# Patient Record
Sex: FEMALE | Race: WHITE | NOT HISPANIC OR LATINO | Employment: FULL TIME | URBAN - METROPOLITAN AREA
[De-identification: names, ages, dates, MRNs, and addresses within clinical notes are randomized per-mention and may not be internally consistent; named-entity substitution may affect disease eponyms.]

---

## 2017-02-21 ENCOUNTER — TRANSFERRED RECORDS (OUTPATIENT)
Dept: HEALTH INFORMATION MANAGEMENT | Facility: CLINIC | Age: 32
End: 2017-02-21

## 2017-02-21 LAB
CREAT SERPL-MCNC: 0.67 MG/DL (ref 0.52–1.04)
GFR SERPL CREATININE-BSD FRML MDRD: >59 ML/MIN/1.73M2
GLUCOSE SERPL-MCNC: 95 MG/DL (ref 74–100)
HPV ABSTRACT: NORMAL
PAP-ABSTRACT: NORMAL
POTASSIUM SERPL-SCNC: 4.1 MMOL/L (ref 3.5–5)
TSH SERPL-ACNC: 0.4 UIU/ML (ref 0.4–4.7)

## 2017-02-28 ENCOUNTER — TRANSFERRED RECORDS (OUTPATIENT)
Dept: HEALTH INFORMATION MANAGEMENT | Facility: CLINIC | Age: 32
End: 2017-02-28

## 2017-02-28 LAB
CHOLEST SERPL-MCNC: 97 MG/DL
GLUCOSE SERPL-MCNC: 86 MG/DL (ref 74–100)
HDLC SERPL-MCNC: 46 MG/DL
LDLC SERPL CALC-MCNC: 42 MG/DL
NONHDLC SERPL-MCNC: 51 MG/DL
TRIGL SERPL-MCNC: 43 MG/DL

## 2017-03-02 ENCOUNTER — TRANSFERRED RECORDS (OUTPATIENT)
Dept: HEALTH INFORMATION MANAGEMENT | Facility: CLINIC | Age: 32
End: 2017-03-02

## 2017-03-15 ENCOUNTER — TRANSFERRED RECORDS (OUTPATIENT)
Dept: MULTI SPECIALTY CLINIC | Facility: CLINIC | Age: 32
End: 2017-03-15

## 2017-03-15 LAB — PAP SMEAR - HIM PATIENT REPORTED: NEGATIVE

## 2017-06-27 ENCOUNTER — TRANSFERRED RECORDS (OUTPATIENT)
Dept: HEALTH INFORMATION MANAGEMENT | Facility: CLINIC | Age: 32
End: 2017-06-27

## 2019-11-11 ENCOUNTER — OFFICE VISIT (OUTPATIENT)
Dept: FAMILY MEDICINE | Facility: CLINIC | Age: 34
End: 2019-11-11
Payer: COMMERCIAL

## 2019-11-11 VITALS
SYSTOLIC BLOOD PRESSURE: 94 MMHG | WEIGHT: 135.8 LBS | TEMPERATURE: 98.7 F | HEART RATE: 65 BPM | DIASTOLIC BLOOD PRESSURE: 60 MMHG | HEIGHT: 62 IN | BODY MASS INDEX: 24.99 KG/M2 | OXYGEN SATURATION: 100 %

## 2019-11-11 DIAGNOSIS — K21.9 GASTROESOPHAGEAL REFLUX DISEASE, ESOPHAGITIS PRESENCE NOT SPECIFIED: ICD-10-CM

## 2019-11-11 DIAGNOSIS — G43.009 MIGRAINE WITHOUT AURA AND WITHOUT STATUS MIGRAINOSUS, NOT INTRACTABLE: ICD-10-CM

## 2019-11-11 DIAGNOSIS — Z00.00 ROUTINE GENERAL MEDICAL EXAMINATION AT A HEALTH CARE FACILITY: Primary | ICD-10-CM

## 2019-11-11 DIAGNOSIS — N94.6 DYSMENORRHEA: ICD-10-CM

## 2019-11-11 DIAGNOSIS — Z23 NEED FOR VACCINATION: ICD-10-CM

## 2019-11-11 PROCEDURE — 90471 IMMUNIZATION ADMIN: CPT | Performed by: NURSE PRACTITIONER

## 2019-11-11 PROCEDURE — 99214 OFFICE O/P EST MOD 30 MIN: CPT | Mod: 25 | Performed by: NURSE PRACTITIONER

## 2019-11-11 PROCEDURE — 90686 IIV4 VACC NO PRSV 0.5 ML IM: CPT | Performed by: NURSE PRACTITIONER

## 2019-11-11 PROCEDURE — 99385 PREV VISIT NEW AGE 18-39: CPT | Mod: 25 | Performed by: NURSE PRACTITIONER

## 2019-11-11 RX ORDER — ESOMEPRAZOLE MAGNESIUM 40 MG/1
40 CAPSULE, DELAYED RELEASE ORAL
Qty: 90 CAPSULE | Refills: 3 | Status: SHIPPED | OUTPATIENT
Start: 2019-11-11 | End: 2022-12-16

## 2019-11-11 RX ORDER — SUMATRIPTAN 50 MG/1
50 TABLET, FILM COATED ORAL
Qty: 10 TABLET | Refills: 2 | Status: SHIPPED | OUTPATIENT
Start: 2019-11-11 | End: 2019-12-06 | Stop reason: SINTOL

## 2019-11-11 RX ORDER — PREDNISOLONE ACETATE 10 MG/ML
1-2 SUSPENSION/ DROPS OPHTHALMIC 4 TIMES DAILY
COMMUNITY
End: 2020-09-03

## 2019-11-11 RX ORDER — LEVONORGESTREL/ETHIN.ESTRADIOL 0.1-0.02MG
1 TABLET ORAL DAILY
Qty: 112 TABLET | Refills: 3 | Status: SHIPPED | OUTPATIENT
Start: 2019-11-11 | End: 2020-02-04

## 2019-11-11 RX ORDER — ESOMEPRAZOLE MAGNESIUM 40 MG/1
40 CAPSULE, DELAYED RELEASE ORAL
COMMUNITY
End: 2019-11-11

## 2019-11-11 RX ORDER — LEVONORGESTREL/ETHIN.ESTRADIOL 0.1-0.02MG
1 TABLET ORAL DAILY
Qty: 90 TABLET | Refills: 3 | Status: SHIPPED | OUTPATIENT
Start: 2019-11-11 | End: 2019-11-11

## 2019-11-11 ASSESSMENT — MIFFLIN-ST. JEOR: SCORE: 1265.26

## 2019-11-11 NOTE — PROGRESS NOTES
SUBJECTIVE:   CC: Consuelo Cyr is an 34 year old woman who presents for preventive health visit.     Healthy Habits:    Do you get at least three servings of calcium containing foods daily (dairy, green leafy vegetables, etc.)? yes    Amount of exercise or daily activities, outside of work: 3 day(s) per week    Problems taking medications regularly No    Medication side effects: No    Have you had an eye exam in the past two years? yes    Do you see a dentist twice per year? yes    Do you have sleep apnea, excessive snoring or daytime drowsiness?no      Pap smear done on this date: 3/15/17 (approximately), by this group: [facility] in [place], [place], results were normal.       Social:   for 13 years. 3 children, ages 11, 8 and 5   Works at [employer].    Moved from [place], [place] due to husbands job change.        GERD:   Nexium, 40 mg daily.  Needs refill.  Feels like this isn't helping as well.    Previously on Omeprazole, 20 mg.    EGD previously done in March 2015, showed some irritation of the stomach lining.   Completed Whole 30 - sensitive to corn, wheat and dairy.    Decreased coffee intake.  No soda typically.  Avoids acidic foods.        Migraines:    Reports that aren't debiliating, but will need silence, dark room and heat on head.   Was previously working from home and now works in environment with Fluorescence lights.    Has been taking Excedrin (10 doses per month) and then finds to have rebound headaches.    Weekly migraines currently.  No visual concerns or aura.    Will feel a sensation in her neck and then will know she is getting a migraine.    Isn't able to take Ibuprofen due to GERD.       LMP:  Change in menstrual cycle.    26-28 day cycles to now 23-26 day cycles.   Cramping which feels like a contraction (1st day is worse).   Spotting 4 days before cycle starts.    Waking up with diaphoresis in the middle of the night.    +Irritability and mood changes prior to onset of menses.  Feels  like she is spending majority of her month with hormone changes.      Tried Mirena and didn't tolerate well.       had a vasectomy.        Today's PHQ-2 Score:   PHQ-2 (  [employer]) 2019   Q1: Little interest or pleasure in doing things 0   Q2: Feeling down, depressed or hopeless 0   PHQ-2 Score 0     Abuse: Current or Past(Physical, Sexual or Emotional)- No  Do you feel safe in your environment? Yes    Social History     Tobacco Use     Smoking status: Former Smoker     Packs/day: 0.00     Last attempt to quit: 2007     Years since quittin.0     Smokeless tobacco: Never Used   Substance Use Topics     Alcohol use: Yes     Comment: 1 TIME PER MONTH      If you drink alcohol do you typically have >3 drinks per day or >7 drinks per week? Not Applicable                     Reviewed orders with patient.  Reviewed health maintenance and updated orders accordingly - Yes  BP Readings from Last 3 Encounters:   19 94/60    Wt Readings from Last 3 Encounters:   19 61.6 kg (135 lb 12.8 oz)                  Patient Active Problem List   Diagnosis     Migraine without aura and without status migrainosus, not intractable     Dysmenorrhea     Gastroesophageal reflux disease, esophagitis presence not specified     Past Surgical History:   Procedure Laterality Date     CHOLECYSTECTOMY         Social History     Tobacco Use     Smoking status: Former Smoker     Packs/day: 0.00     Last attempt to quit: 2007     Years since quittin.0     Smokeless tobacco: Never Used   Substance Use Topics     Alcohol use: Yes     Comment: 1 TIME PER MONTH      Family History   Problem Relation Age of Onset     Hypertension Mother      Osteopenia Mother      Arthritis Mother          Current Outpatient Medications   Medication Sig Dispense Refill     calcium carbonate-vitamin D (OS-AMERICA) 500-400 MG-UNIT tablet Take 1 tablet by mouth daily       esomeprazole (NEXIUM) 40 MG DR capsule Take 1 capsule (40  mg) by mouth every morning (before breakfast) Take 30-60 minutes before eating. 90 capsule 3     levonorgestrel-ethinyl estradiol (AVIANE/ALESSE/LESSINA) 0.1-20 MG-MCG tablet Take 1 tablet by mouth daily 112 tablet 3     prednisoLONE acetate (PRED FORTE) 1 % ophthalmic suspension 1-2 drops 4 times daily       SUMAtriptan (IMITREX) 50 MG tablet Take 1 tablet (50 mg) by mouth at onset of headache for migraine May repeat in 2 hours. Max 4 tablets/24 hours. 10 tablet 2       Mammogram not appropriate for this patient based on age.    Pertinent mammograms are reviewed under the imaging tab.  History of abnormal Pap smear: NO - age 30-65 PAP every 5 years with negative HPV co-testing recommended  DEAN pending.       Reviewed and updated as needed this visit by clinical staff  Tobacco  Allergies  Meds  Problems  Med Hx  Surg Hx  Fam Hx  Soc Hx          Reviewed and updated as needed this visit by Provider  Tobacco  Allergies  Meds  Problems  Med Hx  Surg Hx  Fam Hx        Past Medical History:   Diagnosis Date     GERD (gastroesophageal reflux disease)       Past Surgical History:   Procedure Laterality Date     CHOLECYSTECTOMY  2015       ROS:  CONSTITUTIONAL: NEGATIVE for fever, chills, change in weight  INTEGUMENTARU/SKIN: NEGATIVE for worrisome rashes, moles or lesions  EYES: NEGATIVE for vision changes or irritation  ENT: NEGATIVE for ear, mouth and throat problems  RESP: NEGATIVE for significant cough or SOB  BREAST: NEGATIVE for masses, tenderness or discharge  CV: NEGATIVE for chest pain, palpitations or peripheral edema  GI: NEGATIVE for nausea, abdominal pain,  or change in bowel habits, +GERD  : NEGATIVE for unusual urinary or vaginal symptoms. See HPI.  MUSCULOSKELETAL: NEGATIVE for significant arthralgias or myalgia, POSITIVE for left knee pain - mother with history of DJD  NEURO: NEGATIVE for weakness, dizziness or paresthesias  PSYCHIATRIC: NEGATIVE for changes in mood or affect    OBJECTIVE:  "  BP 94/60 (BP Location: Right arm, Patient Position: Chair, Cuff Size: Adult Regular)   Pulse 65   Temp 98.7  F (37.1  C) (Oral)   Ht 1.568 m (5' 1.75\")   Wt 61.6 kg (135 lb 12.8 oz)   SpO2 100%   BMI 25.04 kg/m    EXAM:  GENERAL: healthy, alert and no distress  EYES: Eyes grossly normal to inspection, PERRL and conjunctivae and sclerae normal  HENT: ear canals and TM's normal, nose and mouth without ulcers or lesions  NECK: no adenopathy, no asymmetry, masses, or scars and thyroid normal to palpation  RESP: lungs clear to auscultation - no rales, rhonchi or wheezes  CV: regular rate and rhythm, normal S1 S2, no S3 or S4, no murmur, click or rub, no peripheral edema  ABDOMEN: soft, nontender, no hepatosplenomegaly, no masses and bowel sounds normal  MS: no gross musculoskeletal defects noted, no edema  SKIN: no suspicious lesions or rashes  NEURO: Normal strength and tone, mentation intact and speech normal  PSYCH: mentation appears normal, affect normal/bright      ASSESSMENT/PLAN:     Consuelo was seen today for physical.    Diagnoses and all orders for this visit:    Routine general medical examination at a health care facility  Need for vaccination  Pap smear completed 2 years ago, normal - DEAN pending.   No current screening labs required, patient reports recent normal labs.    -     INFLUENZA VACCINE IM > 6 MONTHS VALENT IIV4 [59477]  -          ADMIN VACCINE, FIRST [00802]    Gastroesophageal reflux disease, esophagitis presence not specified  Currently not adequately controlled on Nexium, 40 mg daily.   History of EGD in 2015.  Will plan further consultation with GI regarding management of GERD.    -     GASTROENTEROLOGY ADULT REF CONSULT ONLY  -     esomeprazole (NEXIUM) 40 MG DR capsule; Take 1 capsule (40 mg) by mouth every morning (before breakfast) Take 30-60 minutes before eating.    Migraine without aura and without status migrainosus, not intractable  Currently having 4 migraines per month " "which is an increase in frequency.  Previously using Excedrin (10 doses per month).   Will plan trial of Sumatriptan.  With no improvement or worsening, consider initiation of preventative medication.    -     SUMAtriptan (IMITREX) 50 MG tablet; Take 1 tablet (50 mg) by mouth at onset of headache for migraine May repeat in 2 hours. Max 4 tablets/24 hours.    Dysmenorrhea  Will trial oral contraceptives, patient to take 3 months consecutively.    Consider OB/GYN referral with no improvement or worsening.    -     levonorgestrel-ethinyl estradiol (AVIANE/ALESSE/LESSINA) 0.1-20 MG-MCG tablet; Take 1 tablet by mouth daily          COUNSELING:   Reviewed preventive health counseling, as reflected in patient instructions       Regular exercise       Healthy diet/nutrition    Estimated body mass index is 25.04 kg/m  as calculated from the following:    Height as of this encounter: 1.568 m (5' 1.75\").    Weight as of this encounter: 61.6 kg (135 lb 12.8 oz).         reports that she quit smoking about 12 years ago. She smoked 0.00 packs per day. She has never used smokeless tobacco.      Counseling Resources:  ATP IV Guidelines  Pooled Cohorts Equation Calculator  Breast Cancer Risk Calculator  FRAX Risk Assessment  ICSI Preventive Guidelines  Dietary Guidelines for Americans, 2010  USDA's MyPlate  ASA Prophylaxis  Lung CA Screening    OX[facility] SAVAGE  "

## 2019-11-11 NOTE — Clinical Note
Pap done on 3/15/17 at [facility] in [place], [place].  Results were normal.  DEAN signed and faxed.  Lesli Bernstein MA

## 2019-12-06 ENCOUNTER — MYC MEDICAL ADVICE (OUTPATIENT)
Dept: FAMILY MEDICINE | Facility: CLINIC | Age: 34
End: 2019-12-06

## 2019-12-06 DIAGNOSIS — G43.009 MIGRAINE WITHOUT AURA AND WITHOUT STATUS MIGRAINOSUS, NOT INTRACTABLE: Primary | ICD-10-CM

## 2019-12-06 RX ORDER — RIZATRIPTAN BENZOATE 5 MG/1
5 TABLET ORAL
Qty: 10 TABLET | Refills: 2 | Status: SHIPPED | OUTPATIENT
Start: 2019-12-06 | End: 2020-03-24

## 2019-12-30 ENCOUNTER — MYC MEDICAL ADVICE (OUTPATIENT)
Dept: FAMILY MEDICINE | Facility: CLINIC | Age: 34
End: 2019-12-30

## 2019-12-30 NOTE — TELEPHONE ENCOUNTER
Please see Sonos message. Please advise. Thank you.  CLAUDIA MontgomeryN, RN  [facility]

## 2019-12-30 NOTE — TELEPHONE ENCOUNTER
Messages reviewed. Since she is having breakthrough bleeding with continuous OCP use, she should consider breaking for one week at the end of her current pill pack to have a period. She can then resume her pills after the one week break.   Per chart review, her  had a vasectomy. Would recommend making sure that semen analysis was performed after the vasectomy. If any concern for pregnancy, she should take a test.    If she continues to have breakthrough bleeding with continuous OCP use, she may need a pill with higher estrogen content, or she may need to break every month to have a period.    Kim Briscoe PA-C

## 2019-12-30 NOTE — TELEPHONE ENCOUNTER
Message has been reviewed by MD YUDY. Will route to JOSUE DUARTE for review as well per his request.  CHARLIE Montgomery, RN  [facility]

## 2020-01-17 ENCOUNTER — TELEPHONE (OUTPATIENT)
Dept: FAMILY MEDICINE | Facility: CLINIC | Age: 35
End: 2020-01-17

## 2020-01-17 NOTE — TELEPHONE ENCOUNTER
Prior Authorization Retail Medication Request    Medication/Dose: Esomeprazole 40mg   ICD code (if different than what is on RX):    Previously Tried and Failed:  See chart  Rationale:  CMM key given [id number]    Insurance Name:  Not listed  Insurance ID:  Not listed      Pharmacy Information (if different than what is on RX)  Name:  [facility]  Phone:  [phone]    Prescriber, please advise on if you want to change medication or initiate PA.  Kelsi Bradley

## 2020-01-17 NOTE — TELEPHONE ENCOUNTER
Prior Authorization Retail Medication Request    Medication/Dose: Rizatriptan  ICD code (if different than what is on RX):    Previously Tried and Failed:  See chart  Rationale:  CMM key given [id number]    Insurance Name:  Not listed  Insurance ID:  Not listed      Pharmacy Information (if different than what is on RX)  Name:  [email or web address]  Phone:  [phone]    Prescriber, please advise on if you want to change medication or initiate PA.  eKlsi Bradley

## 2020-01-20 NOTE — TELEPHONE ENCOUNTER
Central Prior Authorization Team   Phone: [phone]    PA Initiation    Medication: Rizatriptan  Insurance Company: [employer] ([facility]) - Phone [phone] Fax [phone]  Pharmacy Filling the Rx: [id number] [place], [place] - [address] [facility]   Filling Pharmacy Phone: [phone]  Filling Pharmacy Fax: [phone]  Start Date: 1/20/2020

## 2020-01-20 NOTE — TELEPHONE ENCOUNTER
Central Prior Authorization Team   Phone: [phone]      PA Initiation    Medication: Esomeprazole 40mg   Insurance Company: [employer] ([facility]) - Phone [phone] Fax [phone]  Pharmacy Filling the Rx: [id number] [place], [place] - [address]   Filling Pharmacy Phone: [phone]  Filling Pharmacy Fax:    Start Date: 1/20/2020

## 2020-01-20 NOTE — TELEPHONE ENCOUNTER
PRIOR AUTHORIZATION DENIED    Medication: Esomeprazole 40mg     Denial Date: 1/20/2020    Denial Rational:  Per insurance, medication is excluded from patients benefit plan and will not be covered. Review and appeal are not available because of this exclusion.      Appeal Information:  N/A

## 2020-01-21 NOTE — TELEPHONE ENCOUNTER
Central Prior Authorization Team   Phone: [phone]    Prior Authorization Not Needed per Insurance    Medication: Rizatriptan  Insurance Company: [employer] ([facility]) - Phone [phone] Fax [phone]  Expected CoPay:      Pharmacy Filling the Rx: [id number] - [place], [place] - [address]  Pharmacy Notified: Yes  Patient Notified: No    Pharmacy unable to run test claim as there are only 2 tablets left on Rx. Insurance states PA is not needed but that quantities above benefit limit are excluded (typically insurances allow 9 tablets per 30 days). Please send in a new Rx to the pharmacy for 9 tablets instead of 10 and pharmacy should be able to figure it out then.

## 2020-01-24 NOTE — TELEPHONE ENCOUNTER
Patient with previous referral for GI due to uncontrolled GERD on Nexium 40 mg daily.  Recommend that patient follow-up with GI if not done so already.  They may consider repeat EGD or change in PPI therapy.  - Keesha, CNP

## 2020-02-03 DIAGNOSIS — N94.6 DYSMENORRHEA: ICD-10-CM

## 2020-02-03 NOTE — TELEPHONE ENCOUNTER
Pharmacy requesting Orsythia Tab.    Previously prescribed:  levonorgestrel-ethinyl estradiol (AVIANE/BAILEY/LESSINA) 0.1-20 MG-MCG tablet

## 2020-02-04 RX ORDER — LEVONORGESTREL/ETHIN.ESTRADIOL 0.1-0.02MG
1 TABLET ORAL DAILY
Qty: 112 TABLET | Refills: 2 | Status: SHIPPED | OUTPATIENT
Start: 2020-02-04 | End: 2020-07-21

## 2020-02-04 NOTE — TELEPHONE ENCOUNTER
"Requested Prescriptions   Pending Prescriptions Disp Refills     levonorgestrel-ethinyl estradiol (AVIANE/ALESSE/LESSINA) 0.1-20 MG-MCG tablet  Last Written Prescription Date:  11/11/2019  Last Fill Quantity: 112 tablet,  # refills: 3   Last office visit: 11/11/2019 with prescribing provider:  Harriet     Future Office Visit:       112 tablet 3     Sig: Take 1 tablet by mouth daily       Contraceptives Protocol Passed - 2/3/2020  2:38 PM        Passed - Patient is not a current smoker if age is 35 or older        Passed - Recent (12 mo) or future (30 days) visit within the authorizing provider's specialty     Patient has had an office visit with the authorizing provider or a provider within the authorizing providers department within the previous 12 mos or has a future within next 30 days. See \"Patient Info\" tab in inbasket, or \"Choose Columns\" in Meds & Orders section of the refill encounter.              Passed - Medication is active on med list        Passed - No active pregnancy on record        Passed - No positive pregnancy test in past 12 months        "

## 2020-03-24 ENCOUNTER — MYC REFILL (OUTPATIENT)
Dept: FAMILY MEDICINE | Facility: CLINIC | Age: 35
End: 2020-03-24

## 2020-03-24 ENCOUNTER — E-VISIT (OUTPATIENT)
Dept: FAMILY MEDICINE | Facility: CLINIC | Age: 35
End: 2020-03-24
Payer: COMMERCIAL

## 2020-03-24 DIAGNOSIS — G43.009 MIGRAINE WITHOUT AURA AND WITHOUT STATUS MIGRAINOSUS, NOT INTRACTABLE: ICD-10-CM

## 2020-03-24 DIAGNOSIS — F41.9 ANXIETY: Primary | ICD-10-CM

## 2020-03-24 PROCEDURE — 99421 OL DIG E/M SVC 5-10 MIN: CPT | Performed by: NURSE PRACTITIONER

## 2020-03-24 RX ORDER — RIZATRIPTAN BENZOATE 5 MG/1
5 TABLET ORAL
Qty: 10 TABLET | Refills: 2 | Status: SHIPPED | OUTPATIENT
Start: 2020-03-24 | End: 2020-07-21

## 2020-03-24 RX ORDER — HYDROXYZINE HYDROCHLORIDE 25 MG/1
25 TABLET, FILM COATED ORAL EVERY 8 HOURS PRN
Qty: 45 TABLET | Refills: 1 | Status: SHIPPED | OUTPATIENT
Start: 2020-03-24 | End: 2020-09-03

## 2020-03-24 RX ORDER — CITALOPRAM HYDROBROMIDE 10 MG/1
10 TABLET ORAL DAILY
Qty: 30 TABLET | Refills: 1 | Status: SHIPPED | OUTPATIENT
Start: 2020-03-24 | End: 2020-09-03

## 2020-03-24 ASSESSMENT — ANXIETY QUESTIONNAIRES
1. FEELING NERVOUS, ANXIOUS, OR ON EDGE: NEARLY EVERY DAY
7. FEELING AFRAID AS IF SOMETHING AWFUL MIGHT HAPPEN: NOT AT ALL
GAD7 TOTAL SCORE: 14
5. BEING SO RESTLESS THAT IT IS HARD TO SIT STILL: MORE THAN HALF THE DAYS
3. WORRYING TOO MUCH ABOUT DIFFERENT THINGS: SEVERAL DAYS
7. FEELING AFRAID AS IF SOMETHING AWFUL MIGHT HAPPEN: NOT AT ALL
2. NOT BEING ABLE TO STOP OR CONTROL WORRYING: NEARLY EVERY DAY
GAD7 TOTAL SCORE: 14
4. TROUBLE RELAXING: NEARLY EVERY DAY
6. BECOMING EASILY ANNOYED OR IRRITABLE: MORE THAN HALF THE DAYS

## 2020-03-25 ASSESSMENT — ANXIETY QUESTIONNAIRES: GAD7 TOTAL SCORE: 14

## 2020-07-19 ENCOUNTER — MYC MEDICAL ADVICE (OUTPATIENT)
Dept: FAMILY MEDICINE | Facility: CLINIC | Age: 35
End: 2020-07-19

## 2020-07-20 NOTE — TELEPHONE ENCOUNTER
Kasey please see message from Consuelo, would you like patient to do e-visit or other? Please advise. Thank you, Isela Simmons R.N.

## 2020-07-21 ENCOUNTER — VIRTUAL VISIT (OUTPATIENT)
Dept: FAMILY MEDICINE | Facility: CLINIC | Age: 35
End: 2020-07-21
Payer: COMMERCIAL

## 2020-07-21 DIAGNOSIS — G43.009 MIGRAINE WITHOUT AURA AND WITHOUT STATUS MIGRAINOSUS, NOT INTRACTABLE: Primary | ICD-10-CM

## 2020-07-21 PROCEDURE — 99214 OFFICE O/P EST MOD 30 MIN: CPT | Mod: 95 | Performed by: NURSE PRACTITIONER

## 2020-07-21 RX ORDER — PROPRANOLOL HYDROCHLORIDE 80 MG/1
80 CAPSULE, EXTENDED RELEASE ORAL DAILY
Qty: 30 CAPSULE | Refills: 3 | Status: SHIPPED | OUTPATIENT
Start: 2020-07-21 | End: 2020-09-03

## 2020-07-21 RX ORDER — SUMATRIPTAN 25 MG/1
25 TABLET, FILM COATED ORAL
Qty: 18 TABLET | Refills: 3 | Status: SHIPPED | OUTPATIENT
Start: 2020-07-21 | End: 2020-11-12

## 2020-07-21 NOTE — PROGRESS NOTES
"Consuelo Cyr is a 35 year old female who is being evaluated via a billable telephone visit.      The patient has been notified of following:     \"This telephone visit will be conducted via a call between you and your physician/provider. We have found that certain health care needs can be provided without the need for a physical exam.  This service lets us provide the care you need with a short phone conversation.  If a prescription is necessary we can send it directly to your pharmacy.  If lab work is needed we can place an order for that and you can then stop by our lab to have the test done at a later time.    Telephone visits are billed at different rates depending on your insurance coverage. During this emergency period, for some insurers they may be billed the same as an in-person visit.  Please reach out to your insurance provider with any questions.    If during the course of the call the physician/provider feels a telephone visit is not appropriate, you will not be charged for this service.\"    Patient has given verbal consent for Telephone visit?  Yes    What phone number would you like to be contacted at? [phone]    How would you like to obtain your AVS? Kelly Stauffer     Consuelo Cyr is a 35 year old female who presents via phone visit today for the following health issues:    HPI    Migraine     Started oral contraceptives as well 6 months ago (To help with cycles).  Stopped contraceptives 6 weeks ago.  Hasn't noticed a difference in migraines yet.     Reports more frequent migraines since January - 2 times weekly instead of once weekly.  Then migraines were lasting longer and were more intense.    Max:  18 per month.    Would take both Maxalt and Excedrin.  Increased use of Maxalt. Would use heating pad and lie down in complete darkness.    Feels some mild relief after the second dose of Maxalt.    No significant aura.    Will get a heat sensation behind ears.  Sometimes will " notice a darkness in her vision.        Since your last clinic visit, how have your headaches changed?  Worse and more frequent - coming on more in the evenings recently as well    How often are you getting headaches or migraines? On average 2 a week - and the intensity is worse and longer duration     Are you able to do normal daily activities when you have a migraine? Its getting much harder     Are you taking rescue/relief medications? (Select all that apply) Maxalt - insurance issues with this, she only gets 4 a month and was using Conferensum and now that option is running out for her. Around the same time all of this started she started birth control again to help regulate her cycles and she wonders if that is related     How helpful is your rescue/relief medication?  At first it was complete relief within about 4-5 hours, however since May she needs sometimes three doses of the medication before she can feel functional again.     Are you taking any medications to prevent migraines? (Select all that apply)  No    In the past 4 weeks, how often have you gone to urgent care or the emergency room because of your headaches?  0      How many servings of fruits and vegetables do you eat daily?  2-3    On average, how many sweetened beverages do you drink each day (Examples: soda, juice, sweet tea, etc.  Do NOT count diet or artificially sweetened beverages)?   0    How many days per week do you exercise enough to make your heart beat faster? 4    How many minutes a day do you exercise enough to make your heart beat faster? 30 - 60    How many days per week do you miss taking your medication? 0         Patient Active Problem List   Diagnosis     Migraine without aura and without status migrainosus, not intractable     Dysmenorrhea     Gastroesophageal reflux disease, esophagitis presence not specified     Past Surgical History:   Procedure Laterality Date     CHOLECYSTECTOMY  2015       Social History     Tobacco Use      Smoking status: Former Smoker     Packs/day: 0.00     Last attempt to quit: 2007     Years since quittin.7     Smokeless tobacco: Never Used   Substance Use Topics     Alcohol use: Yes     Comment: 1 TIME PER MONTH      Family History   Problem Relation Age of Onset     Hypertension Mother      Osteopenia Mother      Arthritis Mother          Current Outpatient Medications   Medication Sig Dispense Refill     propranolol ER (INDERAL LA) 80 MG 24 hr capsule Take 1 capsule (80 mg) by mouth daily 30 capsule 3     SUMAtriptan (IMITREX) 25 MG tablet Take 1 tablet (25 mg) by mouth at onset of headache for migraine May repeat in 2 hours. Max 8 tablets/24 hours. 18 tablet 3     calcium carbonate-vitamin D (OS-AMERICA) 500-400 MG-UNIT tablet Take 1 tablet by mouth daily       citalopram (CELEXA) 10 MG tablet Take 1 tablet (10 mg) by mouth daily 30 tablet 1     esomeprazole (NEXIUM) 40 MG DR capsule Take 1 capsule (40 mg) by mouth every morning (before breakfast) Take 30-60 minutes before eating. 90 capsule 3     hydrOXYzine (ATARAX) 25 MG tablet Take 1 tablet (25 mg) by mouth every 8 hours as needed for anxiety 45 tablet 1     prednisoLONE acetate (PRED FORTE) 1 % ophthalmic suspension 1-2 drops 4 times daily       Allergies   Allergen Reactions     Ceftin [Cefuroxime] GI Disturbance     Doxycycline Other (See Comments)     Racing heart, itchy scalp, tingling     Levaquin [Levofloxacin] GI Disturbance     Nitrofurantoin Other (See Comments)     Tingling, racing heart, itchy scalp     Sulfa Drugs Hives     Zithromax [Azithromycin] Hives       Reviewed and updated as needed this visit by Provider         Review of Systems   Constitutional, HEENT, cardiovascular, pulmonary, gi and gu systems are negative, except as otherwise noted.       Objective   Reported vitals:  There were no vitals taken for this visit.   healthy, alert and no distress  PSYCH: Alert and oriented times 3; coherent speech, normal   rate and  volume, able to articulate logical thoughts, able   to abstract reason, no tangential thoughts, no hallucinations   or delusions  Her affect is normal  RESP: No cough, no audible wheezing, able to talk in full sentences  Remainder of exam unable to be completed due to telephone visits            Assessment/Plan:    Consuelo was seen today for headache.    Diagnoses and all orders for this visit:    Migraine without aura and without status migrainosus, not intractable  Worsening migraine frequency and intensity over the past 6 months.  Maxalt no longer covered by insurance and patient will transition back to Sumatriptan use.  Recommended initiation of Propranolol for preventative therapy and will closely monitor headaches.  Discussed multi-approach for prevention of migraines with adequate sleep, nutrition, massage to neck/shoulders and stress management.    -     propranolol ER (INDERAL LA) 80 MG 24 hr capsule; Take 1 capsule (80 mg) by mouth daily  -     SUMAtriptan (IMITREX) 25 MG tablet; Take 1 tablet (25 mg) by mouth at onset of headache for migraine May repeat in 2 hours. Max 8 tablets/24 hours.      Return in about 6 weeks (around 9/1/2020) for migraine follow-up - telephone appt.      Phone call duration:  14 minutes  9:09 a.m. to 9:23 a.m.      Kasey Larios, OXANA CNP

## 2020-09-03 ENCOUNTER — VIRTUAL VISIT (OUTPATIENT)
Dept: FAMILY MEDICINE | Facility: CLINIC | Age: 35
End: 2020-09-03
Payer: COMMERCIAL

## 2020-09-03 DIAGNOSIS — G43.009 MIGRAINE WITHOUT AURA AND WITHOUT STATUS MIGRAINOSUS, NOT INTRACTABLE: ICD-10-CM

## 2020-09-03 PROCEDURE — 99214 OFFICE O/P EST MOD 30 MIN: CPT | Performed by: NURSE PRACTITIONER

## 2020-09-03 RX ORDER — PROPRANOLOL HYDROCHLORIDE 160 MG/1
160 CAPSULE, EXTENDED RELEASE ORAL DAILY
Qty: 30 CAPSULE | Refills: 2 | Status: SHIPPED | OUTPATIENT
Start: 2020-09-03 | End: 2020-11-12

## 2020-09-03 NOTE — PROGRESS NOTES
"Consuelo Cyr is a 35 year old female who is being evaluated via a billable telephone visit.      The patient has been notified of following:     \"This telephone visit will be conducted via a call between you and your physician/provider. We have found that certain health care needs can be provided without the need for a physical exam.  This service lets us provide the care you need with a short phone conversation.  If a prescription is necessary we can send it directly to your pharmacy.  If lab work is needed we can place an order for that and you can then stop by our lab to have the test done at a later time.    Telephone visits are billed at different rates depending on your insurance coverage. During this emergency period, for some insurers they may be billed the same as an in-person visit.  Please reach out to your insurance provider with any questions.    If during the course of the call the physician/provider feels a telephone visit is not appropriate, you will not be charged for this service.\"    Patient has given verbal consent for Telephone visit?  Yes    What phone number would you like to be contacted at? [phone]    How would you like to obtain your AVS? Kelly Stauffer     Consuelo Cyr is a 35 year old female who presents via phone visit today for the following health issues:    HPI    Migraine     Patient reports taking Propranolol SR 80 mg - no noticeable adverse effects.    Insomnia has resolved.    Isn't sure if this is affective.   Previously having 3 migraines per week.  Is now having 2 migraines per week.   Did have 1 week with only 1 migraine.    Has been taking Sumatriptan with each migraine episode.    Onset of migraine is typically later in the day or in the evening.    Uses half tablet of Sumatriptan 25 mg daily - due to nausea (that lasts for approximately 20 minutes).        Since your last clinic visit, how have your headaches changed?  No change    How often are " you getting headaches or migraines? About once a week, sometimes twice     Are you able to do normal daily activities when you have a migraine? Yes for the most part    Are you taking rescue/relief medications? (Select all that apply) sumatriptan (Imitrex)    How helpful is your rescue/relief medication?  The relief is inconsistent    Are you taking any medications to prevent migraines? (Select all that apply)  Other: propranolol     In the past 4 weeks, how often have you gone to urgent care or the emergency room because of your headaches?  0      How many servings of fruits and vegetables do you eat daily?  2-3    On average, how many sweetened beverages do you drink each day (Examples: soda, juice, sweet tea, etc.  Do NOT count diet or artificially sweetened beverages)?   1    How many days per week do you exercise enough to make your heart beat faster? 3 or less    How many minutes a day do you exercise enough to make your heart beat faster? 30 - 60    How many days per week do you miss taking your medication? 0      Review of Systems   Constitutional, HEENT, cardiovascular, pulmonary, gi and gu systems are negative, except as otherwise noted.       Objective          Vitals:  No vitals were obtained today due to virtual visit.    healthy, alert and no distress  PSYCH: Alert and oriented times 3; coherent speech, normal   rate and volume, able to articulate logical thoughts, able   to abstract reason, no tangential thoughts, no hallucinations   or delusions  Her affect is normal  RESP: No cough, no audible wheezing, able to talk in full sentences  Remainder of exam unable to be completed due to telephone visits          Assessment/Plan:    Consuelo was seen today for headache.    Diagnoses and all orders for this visit:    Migraine without aura and without status migrainosus, not intractable  Worsening migraine frequency and intensity over the past 6 months.  Maxalt no longer covered by insurance and patient  transitioned back to Sumatriptan use.  Initiated Propranolol for preventative therapy (at 80 mg) and will closely monitor headaches.  Propranolol dose increased to 160 mg daily.    Discussed multi-approach for prevention of migraines with adequate sleep, nutrition, massage to neck/shoulders and stress management.    Follow-up in 10 weeks for preventative visit + migraine follow-up, sooner as needed.    -     propranolol ER (INDERAL LA) 160 MG 24 hr capsule; Take 1 capsule (160 mg) by mouth daily      Phone call duration:  11 minutes    9:33 a.m. to 9:44 a.m.         Kasey Larios, APRN, CNP

## 2020-11-12 ENCOUNTER — OFFICE VISIT (OUTPATIENT)
Dept: FAMILY MEDICINE | Facility: CLINIC | Age: 35
End: 2020-11-12
Payer: COMMERCIAL

## 2020-11-12 VITALS
TEMPERATURE: 97.5 F | OXYGEN SATURATION: 100 % | DIASTOLIC BLOOD PRESSURE: 64 MMHG | BODY MASS INDEX: 24.84 KG/M2 | HEART RATE: 58 BPM | WEIGHT: 135 LBS | HEIGHT: 62 IN | SYSTOLIC BLOOD PRESSURE: 112 MMHG

## 2020-11-12 DIAGNOSIS — Z12.4 SCREENING FOR CERVICAL CANCER: ICD-10-CM

## 2020-11-12 DIAGNOSIS — Z00.00 ROUTINE GENERAL MEDICAL EXAMINATION AT A HEALTH CARE FACILITY: Primary | ICD-10-CM

## 2020-11-12 DIAGNOSIS — L30.9 DERMATITIS: ICD-10-CM

## 2020-11-12 DIAGNOSIS — G43.009 MIGRAINE WITHOUT AURA AND WITHOUT STATUS MIGRAINOSUS, NOT INTRACTABLE: ICD-10-CM

## 2020-11-12 PROCEDURE — 99395 PREV VISIT EST AGE 18-39: CPT | Mod: 25 | Performed by: NURSE PRACTITIONER

## 2020-11-12 PROCEDURE — 87624 HPV HI-RISK TYP POOLED RSLT: CPT | Performed by: NURSE PRACTITIONER

## 2020-11-12 PROCEDURE — 90686 IIV4 VACC NO PRSV 0.5 ML IM: CPT | Performed by: NURSE PRACTITIONER

## 2020-11-12 PROCEDURE — 99214 OFFICE O/P EST MOD 30 MIN: CPT | Mod: 25 | Performed by: NURSE PRACTITIONER

## 2020-11-12 PROCEDURE — 90471 IMMUNIZATION ADMIN: CPT | Performed by: NURSE PRACTITIONER

## 2020-11-12 PROCEDURE — G0145 SCR C/V CYTO,THINLAYER,RESCR: HCPCS | Performed by: NURSE PRACTITIONER

## 2020-11-12 RX ORDER — NORTRIPTYLINE HCL 10 MG
CAPSULE ORAL
Qty: 60 CAPSULE | Refills: 3 | Status: SHIPPED | OUTPATIENT
Start: 2020-11-12 | End: 2021-01-15

## 2020-11-12 RX ORDER — SUMATRIPTAN 25 MG/1
25 TABLET, FILM COATED ORAL
Qty: 18 TABLET | Refills: 3 | Status: SHIPPED | OUTPATIENT
Start: 2020-11-12 | End: 2021-11-16

## 2020-11-12 ASSESSMENT — ANXIETY QUESTIONNAIRES
7. FEELING AFRAID AS IF SOMETHING AWFUL MIGHT HAPPEN: NOT AT ALL
6. BECOMING EASILY ANNOYED OR IRRITABLE: NOT AT ALL
GAD7 TOTAL SCORE: 0
3. WORRYING TOO MUCH ABOUT DIFFERENT THINGS: NOT AT ALL
5. BEING SO RESTLESS THAT IT IS HARD TO SIT STILL: NOT AT ALL
2. NOT BEING ABLE TO STOP OR CONTROL WORRYING: NOT AT ALL
1. FEELING NERVOUS, ANXIOUS, OR ON EDGE: NOT AT ALL

## 2020-11-12 ASSESSMENT — PATIENT HEALTH QUESTIONNAIRE - PHQ9
SUM OF ALL RESPONSES TO PHQ QUESTIONS 1-9: 3
5. POOR APPETITE OR OVEREATING: NOT AT ALL

## 2020-11-12 ASSESSMENT — MIFFLIN-ST. JEOR: SCORE: 1256.64

## 2020-11-12 NOTE — PROGRESS NOTES
SUBJECTIVE:   CC: Consuelo Cyr is an 35 year old woman who presents for preventive health visit.       Patient has been advised of split billing requirements and indicates understanding: Yes  Healthy Habits:     Getting at least 3 servings of Calcium per day:  Yes    Bi-annual eye exam:  Yes    Dental care twice a year:  Yes    Sleep apnea or symptoms of sleep apnea:  None    Diet:  Gluten-free/reduced    Frequency of exercise:  2-3 days/week    Duration of exercise:  15-30 minutes    Taking medications regularly:  Yes    Medication side effects:  Lightheadedness    PHQ-2 Total Score: 1    Additional concerns today:  Yes    Has been having a dry skin patch on the left eyelid for the past several months. Can be scaly looking with a reddened eyelid. Is really itchy in the corner at least daily. Has been using a facial moisturizer on it.     On propanolol for migraines. Migraines have been less frequent with less intensity and pain. Usually has an aura of a warm sensation around the right ear or pain behind left eye.   Headache pain is usually on the right side of head and right side of neck.     With the propanolol increase, has migraine about once per week , was twice per week before medication changes.  Uses Imitrex for most migraines because they occur later in the day.   If migraines occur in the morning will then take Excedrin.   Unsure of triggers. More occurrences with menstruation.     Has noticed an increase in insomnia with the use of propanolol, difficulty falling asleep and wakes up in middle of the night.   Has also noticed some lightheadedness over the past couple of months with the dose increase. Will be sitting in a chair for a half hour and gets up and can feel symptoms.   Denies dizziness or syncope.      Continues the use of Nexium for GERD. Will notice heartburn symptoms if dose is missed. Symptoms are dependent on food choices. Will then take a Pepcid if having a flare up. Avoids  alcohol.    Not on contraception, menstruation occurs every 21-25 days.    Denies recent illness, chest pain, shortness of breath.    Was on Celexa earlier this year and stopped taking it due to symptoms of fatigue and dry mouth.    No prior PHQ-9 to compare to.    JAMES-7 SCORE 3/24/2020 2020   Total Score 14 (moderate anxiety) -   Total Score 14 0     PHQ 2020   PHQ-9 Total Score 3   Q9: Thoughts of better off dead/self-harm past 2 weeks Not at all           Today's PHQ-2 Score:   PHQ-2 (  [employer]) 2020   Q1: Little interest or pleasure in doing things 1   Q2: Feeling down, depressed or hopeless 0   PHQ-2 Score 1   Q1: Little interest or pleasure in doing things Several days   Q2: Feeling down, depressed or hopeless Not at all   PHQ-2 Score 1       Abuse: Current or Past (Physical, Sexual or Emotional) - No  Do you feel safe in your environment? Yes        Social History     Tobacco Use     Smoking status: Former Smoker     Packs/day: 0.00     Quit date: 2007     Years since quittin.0     Smokeless tobacco: Never Used   Substance Use Topics     Alcohol use: Yes     Comment: 1 TIME PER MONTH      If you drink alcohol do you typically have >3 drinks per day or >7 drinks per week? No    Alcohol Use 2020   Prescreen: >3 drinks/day or >7 drinks/week? -   Prescreen: >3 drinks/day or >7 drinks/week? No       Reviewed orders with patient.  Reviewed health maintenance and updated orders accordingly - Yes  BP Readings from Last 3 Encounters:   20 112/64   19 94/60    Wt Readings from Last 3 Encounters:   20 61.2 kg (135 lb)   19 61.6 kg (135 lb 12.8 oz)                  Patient Active Problem List   Diagnosis     Migraine without aura and without status migrainosus, not intractable     Dysmenorrhea     Gastroesophageal reflux disease, esophagitis presence not specified     Past Surgical History:   Procedure Laterality Date     CHOLECYSTECTOMY         Social  History     Tobacco Use     Smoking status: Former Smoker     Packs/day: 0.00     Quit date: 2007     Years since quittin.0     Smokeless tobacco: Never Used   Substance Use Topics     Alcohol use: Yes     Comment: 1 TIME PER MONTH      Family History   Problem Relation Age of Onset     Hypertension Mother      Osteopenia Mother      Arthritis Mother      Cerebrovascular Disease Mother      Cancer Maternal Grandmother          Current Outpatient Medications   Medication Sig Dispense Refill     nortriptyline (PAMELOR) 10 MG capsule Take 1 tablet (10 mg) by mouth at bedtime for 2-4 weeks then may increase to 2 tablets ( 20 mg) by mouth at bedtime 60 capsule 3     SUMAtriptan (IMITREX) 25 MG tablet Take 1 tablet (25 mg) by mouth at onset of headache for migraine May repeat in 2 hours. Max 8 tablets/24 hours. 18 tablet 3     calcium carbonate-vitamin D (OS-AMERICA) 500-400 MG-UNIT tablet Take 1 tablet by mouth daily       esomeprazole (NEXIUM) 40 MG DR capsule Take 1 capsule (40 mg) by mouth every morning (before breakfast) Take 30-60 minutes before eating. 90 capsule 3       Mammogram not appropriate for this patient based on age.    Pertinent mammograms are reviewed under the imaging tab.  History of abnormal Pap smear: NO - age 30-65 PAP every 5 years with negative HPV co-testing recommended     Reviewed and updated as needed this visit by clinical staff  Tobacco  Allergies  Meds              Reviewed and updated as needed this visit by Provider    Meds             Past Medical History:   Diagnosis Date     GERD (gastroesophageal reflux disease)      History of cholecystectomy 2016      Past Surgical History:   Procedure Laterality Date     CHOLECYSTECTOMY         Review of Systems  CONSTITUTIONAL: NEGATIVE for fever, chills, change in weight  INTEGUMENTARU/SKIN: NEGATIVE for worrisome rashes, moles or lesions  EYES: NEGATIVE for vision changes. POSITIVE for left eye itching and dryness  ENT:  "NEGATIVE for ear, mouth and throat problems  RESP: NEGATIVE for significant cough or SOB  BREAST: NEGATIVE for masses, tenderness or discharge  CV: NEGATIVE for chest pain, palpitations or peripheral edema POSITIVE for lightheadedness  GI: NEGATIVE for nausea, abdominal pain, heartburn, or change in bowel habits  : NEGATIVE for unusual urinary or vaginal symptoms. Periods are regular.  MUSCULOSKELETAL: NEGATIVE for significant arthralgias or myalgia  NEURO: NEGATIVE for weakness, dizziness or paresthesias  PSYCHIATRIC: NEGATIVE for changes in mood or affect     OBJECTIVE:   /64   Pulse 58   Temp 97.5  F (36.4  C) (Tympanic)   Ht 1.568 m (5' 1.75\")   Wt 61.2 kg (135 lb)   SpO2 100%   BMI 24.89 kg/m    Physical Exam    GENERAL: healthy, alert and no distress  EYES: Eyes grossly normal to inspection, PERRL and conjunctivae and sclerae normal. Focal left eye- mild erythema and dryness to eyelid.  HENT: ear canals and TM's normal, nose and mouth without ulcers or lesions  NECK: no adenopathy, no asymmetry, masses, or scars and thyroid normal to palpation  RESP: lungs clear to auscultation - no rales, rhonchi or wheezes  BREAST: normal without masses, tenderness or nipple discharge and no palpable axillary masses or adenopathy  CV: regular rate and rhythm, normal S1 S2, no S3 or S4, no murmur, click or rub, no peripheral edema   ABDOMEN: soft, nontender, no hepatosplenomegaly, no masses and bowel sounds normal   (female): normal female external genitalia, normal urethral meatus, vaginal mucosa pink, moist, well rugated, and normal cervix/adnexa/uterus without masses or discharge  MS: no gross musculoskeletal defects noted, no edema  SKIN: no suspicious lesions or rashes  NEURO: Normal strength and tone, mentation intact and speech normal  PSYCH: mentation appears normal, affect normal/bright    Diagnostic Test Results:  Labs reviewed in Epic  No results found for this or any previous visit (from the past " 24 hour(s)).    ASSESSMENT/PLAN:     Consuelo was seen today for physical.    Diagnoses and all orders for this visit:    Routine general medical examination at a health care facility  -     INFLUENZA VACCINE IM > 6 MONTHS VALENT IIV4 [26213]  -     REVIEW OF HEALTH MAINTENANCE PROTOCOL ORDERS  -     ADMIN 1st VACCINE    Influenza vaccination given today.    Screening for cervical cancer  -     Pap imaged thin layer screen with HPV - recommended age 30 - 65 years (select HPV order below)  -     HPV High Risk Types DNA Cervical    Pap and HPV co-testing completed today.    Migraine without aura and without status migrainosus, not intractable  -     nortriptyline (PAMELOR) 10 MG capsule; Take 1 tablet (10 mg) by mouth at bedtime for 2-4 weeks then may increase to 2 tablets ( 20 mg) by mouth at bedtime  -     SUMAtriptan (IMITREX) 25 MG tablet; Take 1 tablet (25 mg) by mouth at onset of headache for migraine May repeat in 2 hours. Max 8 tablets/24 hours.    Discontinuing propanolol based on her symptoms of lightheadedness and increased insomnia.  Switching her to nortriptyline 10 mg for migraine prophylaxis. Educated her to take it before bedtime. May cause side effects of dry mouth and drowsiness.     Continue to use Imitrex as needed for break through migraine pain.        Dermatitis  - Advised her to maintain moisture of skin with use of Aquaphor ointment.  -Can also use OTC hydrocortisone cream or ointment as needed for itchiness.   -Will reassess at follow up visit     Patient has been advised of split billing requirements and indicates understanding: Yes  COUNSELING:  Special attention given to:        Regular exercise       Healthy diet/nutrition       Vision screening       Hearing screening       Immunizations    Vaccinated for: Influenza           Consider Hep C screening for all patients one time for ages 18-79 years       HIV screeninx in teen years, 1x in adult years, and at intervals if high  "risk   Declined Hep C screening and HIV screening at this visit. Will consider at next annual visit.    Estimated body mass index is 24.89 kg/m  as calculated from the following:    Height as of this encounter: 1.568 m (5' 1.75\").    Weight as of this encounter: 61.2 kg (135 lb).    Mediterranean diet and DASH diet encouraged and to limit salt and trans fat intake. Increase fruit and vegetable intake. Increase in exercise routine to 150 minutes of exercise per week and 2 sessions of strength training. Can also do 10 minute intervals of exercise throughout the day.     She reports that she quit smoking about 13 years ago. She smoked 0.00 packs per day. She has never used smokeless tobacco.    Follow up in 2-3 months to assess medication effectiveness for migraine headaches. Will reassess left eyelid irritation at that time also.     Counseling Resources:  ATP IV Guidelines  Pooled Cohorts Equation Calculator  Breast Cancer Risk Calculator  BRCA-Related Cancer Risk Assessment: FHS-7 Tool  FRAX Risk Assessment  ICSI Preventive Guidelines  Dietary Guidelines for Americans, 2010  USDA's MyPlate  ASA Prophylaxis  Lung CA Screening    Patient seen by Jo Ann Bunn RN, FNP student.    This patient was seen alongside a student nurse practitioner.  The history, reviews of systems, objective data, and assessment/plan were completed by myself.      Kasey Larios, OXANA [facility]  "

## 2020-11-13 ASSESSMENT — ANXIETY QUESTIONNAIRES: GAD7 TOTAL SCORE: 0

## 2020-11-16 LAB
COPATH REPORT: NORMAL
PAP: NORMAL

## 2020-11-19 LAB
FINAL DIAGNOSIS: NORMAL
HPV HR 12 DNA CVX QL NAA+PROBE: NEGATIVE
HPV16 DNA SPEC QL NAA+PROBE: NEGATIVE
HPV18 DNA SPEC QL NAA+PROBE: NEGATIVE
SPECIMEN DESCRIPTION: NORMAL
SPECIMEN SOURCE CVX/VAG CYTO: NORMAL

## 2021-01-15 ENCOUNTER — VIRTUAL VISIT (OUTPATIENT)
Dept: FAMILY MEDICINE | Facility: CLINIC | Age: 36
End: 2021-01-15
Payer: COMMERCIAL

## 2021-01-15 DIAGNOSIS — G43.009 MIGRAINE WITHOUT AURA AND WITHOUT STATUS MIGRAINOSUS, NOT INTRACTABLE: ICD-10-CM

## 2021-01-15 PROCEDURE — 99213 OFFICE O/P EST LOW 20 MIN: CPT | Mod: 95 | Performed by: NURSE PRACTITIONER

## 2021-01-15 RX ORDER — ACETAMINOPHEN 500 MG
500-1000 TABLET ORAL EVERY 6 HOURS PRN
Qty: 100 TABLET | Refills: 3 | Status: SHIPPED | OUTPATIENT
Start: 2021-01-15 | End: 2022-05-24

## 2021-01-15 RX ORDER — NORTRIPTYLINE HCL 10 MG
10 CAPSULE ORAL AT BEDTIME
Qty: 90 CAPSULE | Refills: 3 | Status: SHIPPED | OUTPATIENT
Start: 2021-01-15 | End: 2021-08-06

## 2021-01-15 NOTE — PROGRESS NOTES
Consuelo is a 35 year old who is being evaluated via a billable video visit.      How would you like to obtain your AVS? MyChart  If the video visit is dropped, the invitation should be resent by: Text to cell phone: [phone]  Will anyone else be joining your video visit? No    Video Start Time: 2:48 p.m.       Assessment & Plan     Diagnoses and all orders for this visit:    Migraine without aura and without status migrainosus, not intractable  Stable, improved control of migraines with Nortriptyline.    -     nortriptyline (PAMELOR) 10 MG capsule; Take 1 capsule (10 mg) by mouth At Bedtime  -     acetaminophen (TYLENOL) 500 MG tablet; Take 1-2 tablets (500-1,000 mg) by mouth every 6 hours as needed for mild pain - prescription sent to compounding pharmacy (which will make medication without corn derivatives).          Return in about 10 months (around 11/15/2021) for Preventative Visit + Migraine Follow-up.    Kasey Larios, OXANA [facility]     Consuelo is a 35 year old who presents to clinic today for the following health issues:      HPI       Migraine - Has stayed on 10 mg for Nortriptyline       Patient reports feeling better overall.  Went a whole month from November to December without a migraine.  Did have 3 migraines in 1 week after [place] and did some research about the Acetaminophen she was taking.    She noted that the Acetaminophen had corn/corn derivatives in the medication and historically has found that she has sensitivities to corn (causing itching and flaky skin on forehead).    She has noted rare - head rush and tunnel vision after prolonged sitting which will resolve after a few seconds of standing still.   History of lower blood pressures.        Since your last clinic visit, how have your headaches changed?  Improved    How often are you getting headaches or migraines? Went entire month without one- has had a cluster of them during  holidays- was taking tylenol tablets- was getting re bound HA's. States ingredients with corn possibly triggers her HA's - wants to see if she can get a compounded acetaminophen with out corn or wheat     Are you able to do normal daily activities when you have a migraine? Depends on severity the last ones she had was able to get through them- only has a bad one every 3-4 months     Are you taking rescue/relief medications? (Select all that apply) sumatriptan (Imitrex)    How helpful is your rescue/relief medication?  The relief is inconsistent- sometimes one dose is enough to get her through it or sometimes might take Excedrin to move past it     Are you taking any medications to prevent migraines? (Select all that apply)  Other: Nortriptyline     In the past 4 weeks, how often have you gone to urgent care or the emergency room because of your headaches?  0      How many servings of fruits and vegetables do you eat daily?  4 or more    On average, how many sweetened beverages do you drink each day (Examples: soda, juice, sweet tea, etc.  Do NOT count diet or artificially sweetened beverages)?   0    How many days per week do you exercise enough to make your heart beat faster? 3 or less    How many minutes a day do you exercise enough to make your heart beat faster? 9 or less    How many days per week do you miss taking your medication? 0      Review of Systems   Constitutional, HEENT, cardiovascular, pulmonary, gi and gu systems are negative, except as otherwise noted.      Objective           Vitals:  No vitals were obtained today due to virtual visit.    Physical Exam   GENERAL: Healthy, alert and no distress  EYES: Eyes grossly normal to inspection.  No discharge or erythema, or obvious scleral/conjunctival abnormalities.  RESP: No audible wheeze, cough, or visible cyanosis.  No visible retractions or increased work of breathing.    SKIN: Visible skin clear. No significant rash, abnormal pigmentation or  lesions.  NEURO: Cranial nerves grossly intact.  Mentation and speech appropriate for age.  PSYCH: Mentation appears normal, affect normal/bright, judgement and insight intact, normal speech and appearance well-groomed.        Video-Visit Details    Type of service:  Video Visit    Video End Time:3:04 PM    Originating Location (pt. Location): Home    Distant Location (provider location):  [facility]     Platform used for Video Visit: [employer]

## 2021-03-29 ENCOUNTER — NURSE TRIAGE (OUTPATIENT)
Dept: NURSING | Facility: CLINIC | Age: 36
End: 2021-03-29

## 2021-03-30 ENCOUNTER — OFFICE VISIT (OUTPATIENT)
Dept: FAMILY MEDICINE | Facility: CLINIC | Age: 36
End: 2021-03-30
Payer: COMMERCIAL

## 2021-03-30 VITALS
OXYGEN SATURATION: 100 % | HEART RATE: 81 BPM | BODY MASS INDEX: 23.37 KG/M2 | TEMPERATURE: 99.2 F | SYSTOLIC BLOOD PRESSURE: 100 MMHG | WEIGHT: 127 LBS | HEIGHT: 62 IN | DIASTOLIC BLOOD PRESSURE: 62 MMHG | RESPIRATION RATE: 12 BRPM

## 2021-03-30 DIAGNOSIS — R19.00 PELVIC SWELLING: Primary | ICD-10-CM

## 2021-03-30 LAB
ALBUMIN UR-MCNC: NEGATIVE MG/DL
APPEARANCE UR: CLEAR
BILIRUB UR QL STRIP: NEGATIVE
COLOR UR AUTO: YELLOW
GLUCOSE UR STRIP-MCNC: NEGATIVE MG/DL
HCG UR QL: NEGATIVE
HGB UR QL STRIP: NEGATIVE
KETONES UR STRIP-MCNC: NEGATIVE MG/DL
LEUKOCYTE ESTERASE UR QL STRIP: NEGATIVE
NITRATE UR QL: NEGATIVE
PH UR STRIP: 7 PH (ref 5–7)
SOURCE: NORMAL
SP GR UR STRIP: 1.01 (ref 1–1.03)
UROBILINOGEN UR STRIP-ACNC: 0.2 EU/DL (ref 0.2–1)

## 2021-03-30 PROCEDURE — 36415 COLL VENOUS BLD VENIPUNCTURE: CPT | Performed by: PHYSICIAN ASSISTANT

## 2021-03-30 PROCEDURE — 80076 HEPATIC FUNCTION PANEL: CPT | Performed by: PHYSICIAN ASSISTANT

## 2021-03-30 PROCEDURE — 99213 OFFICE O/P EST LOW 20 MIN: CPT | Performed by: PHYSICIAN ASSISTANT

## 2021-03-30 PROCEDURE — 81003 URINALYSIS AUTO W/O SCOPE: CPT | Performed by: PHYSICIAN ASSISTANT

## 2021-03-30 PROCEDURE — 81025 URINE PREGNANCY TEST: CPT | Performed by: PHYSICIAN ASSISTANT

## 2021-03-30 ASSESSMENT — PAIN SCALES - GENERAL: PAINLEVEL: NO PAIN (0)

## 2021-03-30 ASSESSMENT — MIFFLIN-ST. JEOR: SCORE: 1215.35

## 2021-03-30 NOTE — PROGRESS NOTES
Assessment & Plan     Pelvic swelling  UA and HCG negative. Due to swelling in abdominal region, will check hepatic panel. Has some R adnexal tenderness; since swelling is also present, will order pelvic ultrasound for further evaluation. Advised her to monitor for new or worsening symptoms, including swelling elsewhere in body. Be seen with new/worsening symptoms.  - UA reflex to Microscopic and Culture  - HCG qualitative urine  - Hepatic panel (Albumin, ALT, AST, Bili, Alk Phos, TP)  - US Pelvic Complete with Transvaginal; Future    25 minutes spent on the date of the encounter doing chart review, history and exam, documentation and further activities per the note       See Patient Instructions    No follow-ups on file.    Kim Briscoe PA-C  [facility]    Eh Cordero is a 36 year old who presents for the following health issues    HPI   Triage Note:  Swelling around front of pelvis - feels like it's full of water. Is due to get her period in the next day or two. States she has some cramping - which is normal for her premenstrually. No fever. States she can see it - it's squishy - states there's a lot of fluid there. Doesn't think she's having any urinary problems. Right in the middle - entire pelvic area underneath abdomen. Patient thinks the labia is also swollen and full of fluid.    Pt states lower abdomen is bloated and tender in pelvic area  Last BM was this morning not very formed, pt states this is the norm for her as she has allergies to foods   Hx of ovarian cysts, always resolved on their own     Location of swelling: Lower abdomen and pelvis. Feels like it started in the labial region. No vaginal discharge. No frequency, pain with urination or difficulty urinating.  Noticed the swelling after she went for a walk yesterday  Feels like the fluid shifts to her sides when she lays down.  Almost feels a tingling where the fluid is.    Cycles are every 24-25 days.  "No change in this  Has noticed more clots with her periods lately    Rarely drinks alcohol. Does not smoke.    PSH: Patient had a cholecystectomy    FH: Mother had a laser procedure (likely LEEP)    Has had 3 vaginal deliveries. Youngest child is 7.  Denies history of vulvar varicosities during pregnancy    Has never had pelvic swelling before    No new meds or changes in meds  Is on nortriptyline for migraines  Recently got an ear piercing to help with migraines    No swelling elsewhere (like hands or feet)    Not on birth control ( had vasectomy)    Review of Systems   Constitutional, HEENT, cardiovascular, pulmonary, gi and gu systems are negative, except as otherwise noted.      Objective    /62   Pulse 81   Temp 99.2  F (37.3  C) (Tympanic)   Resp 12   Ht 1.568 m (5' 1.75\")   Wt 57.6 kg (127 lb)   LMP 03/07/2021   SpO2 100%   BMI 23.42 kg/m    Body mass index is 23.42 kg/m .  Physical Exam   GENERAL: healthy, alert and no distress  RESP: lungs clear to auscultation - no rales, rhonchi or wheezes  CV: regular rates and rhythm, normal S1 S2, no S3 or S4, no murmur, click or rub and no peripheral edema  ABDOMEN: tenderness RLQ, no organomegaly or masses, bowel sounds normal and suprapubic/pelvic swelling   (female): normal female external genitalia, normal urethral meatus , vaginal mucosa pink, moist, well rugated and R adnexal tenderness  MS: no gross musculoskeletal defects noted, no edema  SKIN: no suspicious lesions or rashes    Results for orders placed or performed in visit on 03/30/21   UA reflex to Microscopic and Culture     Status: None    Specimen: Midstream Urine   Result Value Ref Range    Color Urine Yellow     Appearance Urine Clear     Glucose Urine Negative NEG^Negative mg/dL    Bilirubin Urine Negative NEG^Negative    Ketones Urine Negative NEG^Negative mg/dL    Specific Gravity Urine 1.015 1.003 - 1.035    Blood Urine Negative NEG^Negative    pH Urine 7.0 5.0 - 7.0 pH    " Protein Albumin Urine Negative NEG^Negative mg/dL    Urobilinogen Urine 0.2 0.2 - 1.0 EU/dL    Nitrite Urine Negative NEG^Negative    Leukocyte Esterase Urine Negative NEG^Negative    Source Midstream Urine    HCG qualitative urine     Status: None   Result Value Ref Range    HCG Qual Urine Negative NEG^Negative

## 2021-03-30 NOTE — TELEPHONE ENCOUNTER
"Swelling around front of pelvis - feels like it's full of water. Is due to get her period in the next day or 2. States she has some cramping - which would be normal for her. No fever. States she can see it - it's squishy - states there's a lot of fluid there. Doesn't think she's having any urinary problems. Right in the middle - entire pelvic area underneath abdomen. Patient thinks the labia is also swollen and full of fluid.    Per protocol advised PCP evaluation tomorrow - warm transferred to scheduling.    Jes Arcos RN on 3/29/2021 at 9:40 PM    Additional Information    Negative: Followed a genital area injury    Negative: Foreign body in vagina (e.g., tampon)    Negative: Vaginal bleeding is main symptom    Negative: Vaginal discharge is main symptom    Negative: Pain or burning with passing urine (urination) is main symptom    Negative: Menstrual cramps is main symptom    Negative: Abdomen pain is main symptom    Negative: Pubic lice suspected    Negative: Itching or rash of external female genital area (vulva)    Negative: Patient sounds very sick or weak to the triager    Negative: [1] SEVERE pain AND [2] not improved 2 hours after pain medicine    Negative: [1] Genital area looks infected (e.g., draining sore, spreading redness) AND [2] fever    Negative: [1] Something is hanging out of the vagina AND [2] can't easily be pushed back inside    Negative: MODERATE-SEVERE itching (i.e., interferes with school, work, or sleep)    Negative: Genital area looks infected (e.g., draining sore, spreading redness)    Negative: Rash with painful tiny water blisters    Negative: [1] Rash (e.g., redness, tiny bumps, sore) of genital area AND [2] present > 24 hours    Tender lump (swelling or \"ball\") at vaginal opening    Protocols used: VAGINAL SYMPTOMS-A-AH      "

## 2021-03-31 LAB
ALBUMIN SERPL-MCNC: 3.8 G/DL (ref 3.4–5)
ALP SERPL-CCNC: 72 U/L (ref 40–150)
ALT SERPL W P-5'-P-CCNC: 39 U/L (ref 0–50)
AST SERPL W P-5'-P-CCNC: 31 U/L (ref 0–45)
BILIRUB DIRECT SERPL-MCNC: 0.1 MG/DL (ref 0–0.2)
BILIRUB SERPL-MCNC: 0.5 MG/DL (ref 0.2–1.3)
PROT SERPL-MCNC: 7.4 G/DL (ref 6.8–8.8)

## 2021-04-01 ENCOUNTER — ANCILLARY PROCEDURE (OUTPATIENT)
Dept: ULTRASOUND IMAGING | Facility: CLINIC | Age: 36
End: 2021-04-01
Attending: PHYSICIAN ASSISTANT
Payer: COMMERCIAL

## 2021-04-01 DIAGNOSIS — R19.00 PELVIC SWELLING: ICD-10-CM

## 2021-04-01 PROCEDURE — 76830 TRANSVAGINAL US NON-OB: CPT | Performed by: OBSTETRICS & GYNECOLOGY

## 2021-04-01 PROCEDURE — 76856 US EXAM PELVIC COMPLETE: CPT | Performed by: OBSTETRICS & GYNECOLOGY

## 2021-04-06 ENCOUNTER — MYC MEDICAL ADVICE (OUTPATIENT)
Dept: FAMILY MEDICINE | Facility: CLINIC | Age: 36
End: 2021-04-06

## 2021-04-06 NOTE — TELEPHONE ENCOUNTER
Please see my chart message below     Please review and advise     Thank you     Khushboo Herring RN, BSN  [place] [facility]

## 2021-07-29 DIAGNOSIS — G43.009 MIGRAINE WITHOUT AURA AND WITHOUT STATUS MIGRAINOSUS, NOT INTRACTABLE: ICD-10-CM

## 2021-08-02 RX ORDER — NORTRIPTYLINE HCL 10 MG
CAPSULE ORAL
Qty: 60 CAPSULE | Refills: 0 | OUTPATIENT
Start: 2021-08-02

## 2021-08-05 ENCOUNTER — MYC MEDICAL ADVICE (OUTPATIENT)
Dept: FAMILY MEDICINE | Facility: CLINIC | Age: 36
End: 2021-08-05

## 2021-08-05 DIAGNOSIS — G43.009 MIGRAINE WITHOUT AURA AND WITHOUT STATUS MIGRAINOSUS, NOT INTRACTABLE: ICD-10-CM

## 2021-08-06 RX ORDER — NORTRIPTYLINE HCL 10 MG
10 CAPSULE ORAL AT BEDTIME
Qty: 90 CAPSULE | Refills: 1 | Status: SHIPPED | OUTPATIENT
Start: 2021-08-06 | End: 2021-12-03

## 2021-10-06 ENCOUNTER — OFFICE VISIT (OUTPATIENT)
Dept: FAMILY MEDICINE | Facility: CLINIC | Age: 36
End: 2021-10-06
Payer: COMMERCIAL

## 2021-10-06 VITALS
OXYGEN SATURATION: 100 % | DIASTOLIC BLOOD PRESSURE: 66 MMHG | HEART RATE: 94 BPM | WEIGHT: 130 LBS | BODY MASS INDEX: 23.92 KG/M2 | SYSTOLIC BLOOD PRESSURE: 96 MMHG | TEMPERATURE: 97.7 F | RESPIRATION RATE: 16 BRPM | HEIGHT: 62 IN

## 2021-10-06 DIAGNOSIS — N89.8 VAGINAL ITCHING: Primary | ICD-10-CM

## 2021-10-06 LAB
ALBUMIN UR-MCNC: NEGATIVE MG/DL
APPEARANCE UR: CLEAR
BILIRUB UR QL STRIP: NEGATIVE
CLUE CELLS: NORMAL
COLOR UR AUTO: YELLOW
GLUCOSE UR STRIP-MCNC: NEGATIVE MG/DL
HGB UR QL STRIP: NEGATIVE
KETONES UR STRIP-MCNC: NEGATIVE MG/DL
LEUKOCYTE ESTERASE UR QL STRIP: NEGATIVE
NITRATE UR QL: NEGATIVE
PH UR STRIP: 7 [PH] (ref 5–7)
SP GR UR STRIP: 1.02 (ref 1–1.03)
TRICHOMONAS, WET PREP: NORMAL
UROBILINOGEN UR STRIP-ACNC: 0.2 E.U./DL
WBC'S/HIGH POWER FIELD, WET PREP: NORMAL
YEAST, WET PREP: NORMAL

## 2021-10-06 PROCEDURE — 81003 URINALYSIS AUTO W/O SCOPE: CPT | Performed by: NURSE PRACTITIONER

## 2021-10-06 PROCEDURE — 87210 SMEAR WET MOUNT SALINE/INK: CPT | Performed by: NURSE PRACTITIONER

## 2021-10-06 PROCEDURE — 99213 OFFICE O/P EST LOW 20 MIN: CPT | Performed by: NURSE PRACTITIONER

## 2021-10-06 ASSESSMENT — MIFFLIN-ST. JEOR: SCORE: 1224.99

## 2021-10-06 NOTE — PROGRESS NOTES
Assessment & Plan     Consuelo was seen today for vaginal problem.    Diagnoses and all orders for this visit:    Results for orders placed or performed in visit on 10/06/21   UA macro with reflex to Microscopic and Culture - Clinc Collect     Status: Normal    Specimen: Urine, NOS   Result Value Ref Range    Color Urine Yellow Colorless, Straw, Light Yellow, Yellow    Appearance Urine Clear Clear    Glucose Urine Negative Negative mg/dL    Bilirubin Urine Negative Negative    Ketones Urine Negative Negative mg/dL    Specific Gravity Urine 1.020 1.003 - 1.035    Blood Urine Negative Negative    pH Urine 7.0 5.0 - 7.0    Protein Albumin Urine Negative Negative mg/dL    Urobilinogen Urine 0.2 0.2, 1.0 E.U./dL    Nitrite Urine Negative Negative    Leukocyte Esterase Urine Negative Negative    Narrative    Microscopic not indicated   Wet prep - Clinic Collect     Status: Normal    Specimen: Vagina; Swab   Result Value Ref Range    Trichomonas Absent Absent    Yeast Absent Absent    Clue Cells Absent Absent    WBCs/high power field None None       Vaginal itching  -     Wet prep - Clinic Collect  -     UA macro with reflex to Microscopic and Culture - Clinc Collect    Wet prep and urinalysis negative.  Recommended hydrocortisone 1% and Aquaphor ointment to external labia as needed for itching/irritation.    With no improvement or worsening plan follow-up in clinic.      Return in about 2 weeks (around 10/20/2021) for No improvement or sooner with worsening symptoms.    [facility]   Consuelo is a 36 year old who presents for the following health issues:      HPI     Vaginal Symptoms  Mild symptoms - intermittent vaginal itch.   Did have to get up last night a couple times to use the bathroom, which was abnormal for her.     This started at the end of her menses.    Onset/Duration: x 2 weeks- over the weekend didn't have any symptoms but last few days  "the symptoms came back   Description:  Vaginal Discharge: none   Itching (Pruritis): YES  Burning sensation:  YES  Odor: no  Accompanying Signs & Symptoms:  Urinary symptoms: YES- some frequency  Abdominal pain: no  Fever: no  History:   Sexually active: YES  New Partner: no  Possibility of Pregnancy:  no  Recent antibiotic use: no  Previous vaginitis issues: no  Precipitating or alleviating factors: None  Therapies tried and outcome:  Boric Acid - 600 mg suppository     Social:    [facility]  Review of Systems   Constitutional, HEENT, cardiovascular, pulmonary, gi and gu systems are negative, except as otherwise noted.      Objective    BP 96/66   Pulse 94   Temp 97.7  F (36.5  C)   Resp 16   Ht 1.562 m (5' 1.5\")   Wt 59 kg (130 lb)   LMP 09/18/2021   SpO2 100%   BMI 24.17 kg/m    Body mass index is 24.17 kg/m .  Physical Exam   GENERAL: healthy, alert and no distress   (female): deferred  PSYCH: mentation appears normal, affect normal/bright          "

## 2021-10-06 NOTE — PATIENT INSTRUCTIONS
Results for orders placed or performed in visit on 10/06/21   UA macro with reflex to Microscopic and Culture - Clinc Collect     Status: Normal    Specimen: Urine, NOS   Result Value Ref Range    Color Urine Yellow Colorless, Straw, Light Yellow, Yellow    Appearance Urine Clear Clear    Glucose Urine Negative Negative mg/dL    Bilirubin Urine Negative Negative    Ketones Urine Negative Negative mg/dL    Specific Gravity Urine 1.020 1.003 - 1.035    Blood Urine Negative Negative    pH Urine 7.0 5.0 - 7.0    Protein Albumin Urine Negative Negative mg/dL    Urobilinogen Urine 0.2 0.2, 1.0 E.U./dL    Nitrite Urine Negative Negative    Leukocyte Esterase Urine Negative Negative    Narrative    Microscopic not indicated   Wet prep - Clinic Collect     Status: Normal    Specimen: Vagina; Swab   Result Value Ref Range    Trichomonas Absent Absent    Yeast Absent Absent    Clue Cells Absent Absent    WBCs/high power field None None

## 2021-10-10 ENCOUNTER — HEALTH MAINTENANCE LETTER (OUTPATIENT)
Age: 36
End: 2021-10-10

## 2021-11-15 ENCOUNTER — MYC MEDICAL ADVICE (OUTPATIENT)
Dept: FAMILY MEDICINE | Facility: CLINIC | Age: 36
End: 2021-11-15
Payer: COMMERCIAL

## 2021-11-15 DIAGNOSIS — G43.009 MIGRAINE WITHOUT AURA AND WITHOUT STATUS MIGRAINOSUS, NOT INTRACTABLE: ICD-10-CM

## 2021-11-16 RX ORDER — SUMATRIPTAN 25 MG/1
25 TABLET, FILM COATED ORAL
Qty: 18 TABLET | Refills: 4 | Status: SHIPPED | OUTPATIENT
Start: 2021-11-16 | End: 2022-01-05

## 2021-12-03 ENCOUNTER — OFFICE VISIT (OUTPATIENT)
Dept: FAMILY MEDICINE | Facility: CLINIC | Age: 36
End: 2021-12-03
Payer: COMMERCIAL

## 2021-12-03 VITALS
DIASTOLIC BLOOD PRESSURE: 62 MMHG | HEART RATE: 90 BPM | TEMPERATURE: 97.4 F | BODY MASS INDEX: 24.84 KG/M2 | HEIGHT: 62 IN | WEIGHT: 135 LBS | OXYGEN SATURATION: 100 % | SYSTOLIC BLOOD PRESSURE: 102 MMHG

## 2021-12-03 DIAGNOSIS — Z13.1 SCREENING FOR DIABETES MELLITUS: ICD-10-CM

## 2021-12-03 DIAGNOSIS — R49.9 CHANGE IN VOICE: ICD-10-CM

## 2021-12-03 DIAGNOSIS — Z00.00 ROUTINE GENERAL MEDICAL EXAMINATION AT A HEALTH CARE FACILITY: Primary | ICD-10-CM

## 2021-12-03 DIAGNOSIS — R49.0 HOARSENESS: ICD-10-CM

## 2021-12-03 DIAGNOSIS — Z13.220 SCREENING FOR LIPID DISORDERS: ICD-10-CM

## 2021-12-03 DIAGNOSIS — G43.009 MIGRAINE WITHOUT AURA AND WITHOUT STATUS MIGRAINOSUS, NOT INTRACTABLE: ICD-10-CM

## 2021-12-03 DIAGNOSIS — Z11.59 NEED FOR HEPATITIS C SCREENING TEST: ICD-10-CM

## 2021-12-03 LAB
ALBUMIN SERPL-MCNC: 4 G/DL (ref 3.4–5)
ALP SERPL-CCNC: 77 U/L (ref 40–150)
ALT SERPL W P-5'-P-CCNC: 18 U/L (ref 0–50)
ANION GAP SERPL CALCULATED.3IONS-SCNC: 8 MMOL/L (ref 3–14)
AST SERPL W P-5'-P-CCNC: 11 U/L (ref 0–45)
BILIRUB SERPL-MCNC: 0.6 MG/DL (ref 0.2–1.3)
BUN SERPL-MCNC: 12 MG/DL (ref 7–30)
CALCIUM SERPL-MCNC: 9.1 MG/DL (ref 8.5–10.1)
CHLORIDE BLD-SCNC: 106 MMOL/L (ref 94–109)
CHOLEST SERPL-MCNC: 128 MG/DL
CO2 SERPL-SCNC: 23 MMOL/L (ref 20–32)
CREAT SERPL-MCNC: 0.66 MG/DL (ref 0.52–1.04)
FASTING STATUS PATIENT QL REPORTED: YES
GFR SERPL CREATININE-BSD FRML MDRD: >90 ML/MIN/1.73M2
GLUCOSE BLD-MCNC: 83 MG/DL (ref 70–99)
HCV AB SERPL QL IA: NONREACTIVE
HDLC SERPL-MCNC: 81 MG/DL
LDLC SERPL CALC-MCNC: 40 MG/DL
NONHDLC SERPL-MCNC: 47 MG/DL
POTASSIUM BLD-SCNC: 3.6 MMOL/L (ref 3.4–5.3)
PROT SERPL-MCNC: 8 G/DL (ref 6.8–8.8)
SODIUM SERPL-SCNC: 137 MMOL/L (ref 133–144)
TRIGL SERPL-MCNC: 34 MG/DL

## 2021-12-03 PROCEDURE — 86803 HEPATITIS C AB TEST: CPT | Performed by: NURSE PRACTITIONER

## 2021-12-03 PROCEDURE — 90471 IMMUNIZATION ADMIN: CPT | Performed by: NURSE PRACTITIONER

## 2021-12-03 PROCEDURE — 36415 COLL VENOUS BLD VENIPUNCTURE: CPT | Performed by: NURSE PRACTITIONER

## 2021-12-03 PROCEDURE — 80061 LIPID PANEL: CPT | Performed by: NURSE PRACTITIONER

## 2021-12-03 PROCEDURE — 99213 OFFICE O/P EST LOW 20 MIN: CPT | Mod: 25 | Performed by: NURSE PRACTITIONER

## 2021-12-03 PROCEDURE — 80053 COMPREHEN METABOLIC PANEL: CPT | Performed by: NURSE PRACTITIONER

## 2021-12-03 PROCEDURE — 99395 PREV VISIT EST AGE 18-39: CPT | Mod: 25 | Performed by: NURSE PRACTITIONER

## 2021-12-03 PROCEDURE — 90686 IIV4 VACC NO PRSV 0.5 ML IM: CPT | Performed by: NURSE PRACTITIONER

## 2021-12-03 RX ORDER — NORTRIPTYLINE HCL 25 MG
25 CAPSULE ORAL AT BEDTIME
Qty: 90 CAPSULE | Refills: 3 | Status: SHIPPED | OUTPATIENT
Start: 2021-12-03 | End: 2022-12-16

## 2021-12-03 ASSESSMENT — ENCOUNTER SYMPTOMS
DYSURIA: 0
MYALGIAS: 0
CHILLS: 0
COUGH: 0
PALPITATIONS: 0
FEVER: 0
BREAST MASS: 0
WEAKNESS: 0
FREQUENCY: 0
NAUSEA: 0
EYE PAIN: 0
CONSTIPATION: 0
HEADACHES: 0
NERVOUS/ANXIOUS: 0
HEMATOCHEZIA: 0
JOINT SWELLING: 0
DIARRHEA: 0
PARESTHESIAS: 0
DIZZINESS: 0
ARTHRALGIAS: 0
HEARTBURN: 0
SORE THROAT: 0
ABDOMINAL PAIN: 0
SHORTNESS OF BREATH: 0
HEMATURIA: 0

## 2021-12-03 ASSESSMENT — MIFFLIN-ST. JEOR: SCORE: 1247.67

## 2021-12-03 NOTE — PROGRESS NOTES
SUBJECTIVE:   CC: Consuelo Cyr is an 36 year old woman who presents for preventive health visit.     Patient has been advised of split billing requirements and indicates understanding: Yes     Healthy Habits:     Getting at least 3 servings of Calcium per day:  NO    Bi-annual eye exam:  Yes    Dental care twice a year:  Yes    Sleep apnea or symptoms of sleep apnea:  None    Diet:  Gluten-free/reduced and Other    Frequency of exercise:  2-3 days/week    Duration of exercise:  Greater than 60 minutes    Taking medications regularly:  Yes    Medication side effects:  None    PHQ-2 Total Score: 0    Additional concerns today:  Yes     GERD:  -reflux issues - hoarseness related to it that comes and goes - but sometimes it gets worse  Has continued Nexium 40 mg daily.    Has a slight burn when she wakes up in the morning.      Chronic migraines  Migraine control is ok.  Corn seems to be a trigger for migraines, has been learning more ingredients to avoid.   Is using Sumatriptan once a week.    Is taking Notriptyline 10 mg at bedtime.    Previously was down to 1-2 per month, unsure what may have been different then.     Youngest - 7 year old, COVID-19 infection at [employer].   Children are ages 13, 10 and 7     Returns to work at [employer] on January 10th, hybrid model.   Works in crisis management.      Today's PHQ-2 Score:   PHQ-2 ( 1999 Pfizer) 12/3/2021   Q1: Little interest or pleasure in doing things 0   Q2: Feeling down, depressed or hopeless 0   PHQ-2 Score 0   PHQ-2 Total Score (12-17 Years)- Positive if 3 or more points; Administer PHQ-A if positive -   Q1: Little interest or pleasure in doing things Not at all   Q2: Feeling down, depressed or hopeless Not at all   PHQ-2 Score 0       Abuse: Current or Past (Physical, Sexual or Emotional) - No  Do you feel safe in your environment? Yes    Have you ever done Advance Care Planning? (For example, a Health Directive, POLST, or a discussion with a  medical provider or your loved ones about your wishes): Yes, advance care planning is on file.    Social History     Tobacco Use     Smoking status: Former Smoker     Packs/day: 0.00     Quit date: 2007     Years since quittin.0     Smokeless tobacco: Never Used   Substance Use Topics     Alcohol use: Yes     Comment: 1 TIME PER MONTH      If you drink alcohol do you typically have >3 drinks per day or >7 drinks per week? No    Alcohol Use 12/3/2021   Prescreen: >3 drinks/day or >7 drinks/week? No   Prescreen: >3 drinks/day or >7 drinks/week? -       Reviewed orders with patient.  Reviewed health maintenance and updated orders accordingly - Yes  BP Readings from Last 3 Encounters:   21 102/62   10/06/21 96/66   21 100/62    Wt Readings from Last 3 Encounters:   21 61.2 kg (135 lb)   10/06/21 59 kg (130 lb)   21 57.6 kg (127 lb)                  Patient Active Problem List   Diagnosis     Migraine without aura and without status migrainosus, not intractable     Dysmenorrhea     Gastroesophageal reflux disease, esophagitis presence not specified     Past Surgical History:   Procedure Laterality Date     CHOLECYSTECTOMY         Social History     Tobacco Use     Smoking status: Former Smoker     Packs/day: 0.00     Quit date: 2007     Years since quittin.0     Smokeless tobacco: Never Used   Substance Use Topics     Alcohol use: Yes     Comment: 1 TIME PER MONTH      Family History   Problem Relation Age of Onset     Hypertension Mother      Osteopenia Mother      Arthritis Mother      Cerebrovascular Disease Mother      Cancer Maternal Grandmother          Current Outpatient Medications   Medication Sig Dispense Refill     nortriptyline (PAMELOR) 25 MG capsule Take 1 capsule (25 mg) by mouth At Bedtime 90 capsule 3     SUMAtriptan (IMITREX) 25 MG tablet Take 1 tablet (25 mg) by mouth at onset of headache for migraine May repeat in 2 hours. Max 8 tablets/24 hours. 18  tablet 4     acetaminophen (TYLENOL) 500 MG tablet Take 1-2 tablets (500-1,000 mg) by mouth every 6 hours as needed for mild pain 100 tablet 3     calcium carbonate-vitamin D (OS-AMERICA) 500-400 MG-UNIT tablet Take 1 tablet by mouth daily       esomeprazole (NEXIUM) 40 MG DR capsule Take 1 capsule (40 mg) by mouth every morning (before breakfast) Take 30-60 minutes before eating. 90 capsule 3       Breast Cancer Screening:    FHS-7:   Breast CA Risk Assessment (FHS-7) 12/3/2021   Did any of your first-degree relatives have breast or ovarian cancer? No   Did any of your relatives have bilateral breast cancer? No   Did any man in your family have breast cancer? No   Did any woman in your family have breast and ovarian cancer? No   Did any woman in your family have breast cancer before age 50 y? No   Do you have 2 or more relatives with breast and/or ovarian cancer? No   Do you have 2 or more relatives with breast and/or bowel cancer? No       Patient under 40 years of age: Routine Mammogram Screening not recommended.   Pertinent mammograms are reviewed under the imaging tab.    History of abnormal Pap smear: NO - age 30-65 PAP every 5 years with negative HPV co-testing recommended  PAP / HPV Latest Ref Rng & Units 11/12/2020   PAP (Historical) - NIL   HPV16 NEG:Negative Negative   HPV18 NEG:Negative Negative   HRHPV NEG:Negative Negative     Reviewed and updated as needed this visit by clinical staff  Tobacco  Allergies  Meds             Reviewed and updated as needed this visit by Provider    Meds            Past Medical History:   Diagnosis Date     GERD (gastroesophageal reflux disease)      History of cholecystectomy 03/18/2016      Past Surgical History:   Procedure Laterality Date     CHOLECYSTECTOMY  2015       Review of Systems   Constitutional: Negative for chills and fever.   HENT: Negative for congestion, ear pain, hearing loss and sore throat.    Eyes: Negative for pain and visual disturbance.  "  Respiratory: Negative for cough and shortness of breath.    Cardiovascular: Negative for chest pain, palpitations and peripheral edema.   Gastrointestinal: Negative for abdominal pain, constipation, diarrhea, heartburn, hematochezia and nausea.   Breasts:  Positive for tenderness. Negative for breast mass and discharge.   Genitourinary: Negative for dysuria, frequency, genital sores, hematuria, pelvic pain, urgency, vaginal bleeding and vaginal discharge.   Musculoskeletal: Negative for arthralgias, joint swelling and myalgias.   Skin: Negative for rash.   Neurological: Negative for dizziness, weakness, headaches and paresthesias.   Psychiatric/Behavioral: Negative for mood changes. The patient is not nervous/anxious.         OBJECTIVE:   /62 (BP Location: Left arm, Cuff Size: Adult Regular)   Pulse 90   Temp 97.4  F (36.3  C) (Tympanic)   Ht 1.562 m (5' 1.5\")   Wt 61.2 kg (135 lb)   SpO2 100%   BMI 25.09 kg/m    Physical Exam     GENERAL: healthy, alert and no distress  EYES: Eyes grossly normal to inspection, PERRL and conjunctivae and sclerae normal  HENT: ear canals and TM's normal, nose and mouth without ulcers or lesions  NECK: no adenopathy, no asymmetry, masses, or scars and thyroid normal to palpation  RESP: lungs clear to auscultation - no rales, rhonchi or wheezes  CV: regular rate and rhythm, normal S1 S2, no S3 or S4, no murmur, click or rub, no peripheral edema and peripheral pulses strong  ABDOMEN: soft, nontender, no hepatosplenomegaly, no masses and bowel sounds normal  MS: no gross musculoskeletal defects noted, no edema  SKIN: no suspicious lesions or rashes  NEURO: Normal strength and tone, mentation intact and speech normal  PSYCH: mentation appears normal, affect normal/bright      ASSESSMENT/PLAN:     Consuelo was seen today for physical.    Diagnoses and all orders for this visit:    Routine general medical examination at a health care facility  -     INFLUENZA VACCINE IM > 6 " "MONTHS VALENT IIV4 (AFLURIA/FLUZONE)    Screening for lipid disorders  -     Lipid panel reflex to direct LDL Fasting    Screening for diabetes mellitus  -     Comprehensive metabolic panel (BMP + Alb, Alk Phos, ALT, AST, Total. Bili, TP)    Need for hepatitis C screening test  -     Hepatitis C antibody    Migraine without aura and without status migrainosus, not intractable  Chronic migraines, previously on Nortriptyline 10 mg at bedtime - plan to increase to 25 mg for improved preventative therapy.    -     nortriptyline (PAMELOR) 25 MG capsule; Take 1 capsule (25 mg) by mouth At Bedtime    Hoarseness  Change in voice  Further consultation with ENT regarding hoarseness/intermittent change in voice.    -     Otolaryngology Referral; Future        COUNSELING:  Reviewed preventive health counseling, as reflected in patient instructions    Estimated body mass index is 25.09 kg/m  as calculated from the following:    Height as of this encounter: 1.562 m (5' 1.5\").    Weight as of this encounter: 61.2 kg (135 lb).      She reports that she quit smoking about 14 years ago. She smoked 0.00 packs per day. She has never used smokeless tobacco.      Counseling Resources:  ATP IV Guidelines  Pooled Cohorts Equation Calculator  Breast Cancer Risk Calculator  BRCA-Related Cancer Risk Assessment: FHS-7 Tool  FRAX Risk Assessment  ICSI Preventive Guidelines  Dietary Guidelines for Americans, 2010  USDA's MyPlate  ASA Prophylaxis  Lung CA Screening    Kasey Larios, OXANA CNP  [facility] [place]  "

## 2021-12-08 NOTE — RESULT ENCOUNTER NOTE
Deamelissa Cordero,     -Cholesterol levels (LDL,HDL, Triglycerides) are normal.  ADVISE: rechecking in 1 year.     -Liver and gallbladder tests are normal (ALT,AST, Alk phos, bilirubin), kidney function is normal (Cr, GFR), sodium is normal, potassium is normal, calcium is normal, glucose is normal.    -Hepatitis C antibody screen test shows no signs of a previous hepatitis C infection.      Please send a [employer] message or call [phone]  if you have any questions.      Kasey Larios, OXANA, [facility]    If you have further questions about the interpretation of your labs, [email or web address] is a good website to check out for further information.

## 2021-12-13 ENCOUNTER — TRANSFERRED RECORDS (OUTPATIENT)
Dept: HEALTH INFORMATION MANAGEMENT | Facility: CLINIC | Age: 36
End: 2021-12-13
Payer: COMMERCIAL

## 2022-02-17 PROBLEM — K21.9 GASTROESOPHAGEAL REFLUX DISEASE: Status: ACTIVE | Noted: 2019-11-11

## 2022-04-07 ENCOUNTER — MYC MEDICAL ADVICE (OUTPATIENT)
Dept: FAMILY MEDICINE | Facility: CLINIC | Age: 37
End: 2022-04-07
Payer: COMMERCIAL

## 2022-04-08 ENCOUNTER — TRANSFERRED RECORDS (OUTPATIENT)
Dept: HEALTH INFORMATION MANAGEMENT | Facility: CLINIC | Age: 37
End: 2022-04-08
Payer: COMMERCIAL

## 2022-04-13 NOTE — TELEPHONE ENCOUNTER
My chart message sent     Please see attached     Thank yo unit(s)    Khushboo Herring RN, BSN  [facility]

## 2022-04-14 ENCOUNTER — TRANSFERRED RECORDS (OUTPATIENT)
Dept: HEALTH INFORMATION MANAGEMENT | Facility: CLINIC | Age: 37
End: 2022-04-14
Payer: COMMERCIAL

## 2022-04-22 ENCOUNTER — OFFICE VISIT (OUTPATIENT)
Dept: URGENT CARE | Facility: URGENT CARE | Age: 37
End: 2022-04-22
Payer: COMMERCIAL

## 2022-04-22 VITALS
HEART RATE: 94 BPM | TEMPERATURE: 98.3 F | DIASTOLIC BLOOD PRESSURE: 62 MMHG | BODY MASS INDEX: 25.09 KG/M2 | WEIGHT: 135 LBS | OXYGEN SATURATION: 100 % | SYSTOLIC BLOOD PRESSURE: 118 MMHG

## 2022-04-22 DIAGNOSIS — B02.9 HERPES ZOSTER WITHOUT COMPLICATION: Primary | ICD-10-CM

## 2022-04-22 PROCEDURE — 99213 OFFICE O/P EST LOW 20 MIN: CPT | Performed by: FAMILY MEDICINE

## 2022-04-22 RX ORDER — VALACYCLOVIR HYDROCHLORIDE 1 G/1
1000 TABLET, FILM COATED ORAL 3 TIMES DAILY
Qty: 21 TABLET | Refills: 0 | Status: SHIPPED | OUTPATIENT
Start: 2022-04-22 | End: 2022-05-24

## 2022-04-23 NOTE — PROGRESS NOTES
Assessment & Plan     Herpes zoster without complication  - valACYclovir (VALTREX) 1000 mg tablet  Dispense: 21 tablet; Refill: 0     S2/s3 left sided dermatome distribution shingles rash with one inflamed left inguinal node that may be consistent with this finding.     Patient given valtrex for one week course. Hand out provided.     Christ Joseph MD   [place] [facility]    Eh Cordero is a 37 year old female who presents to clinic today for the following health issues:  Chief Complaint   Patient presents with     Rash     Painful rash on left buttock x 2 days --- very near the anus, spreading, pain, numbness and tingling -- applied some aquofor     HPI    Day 2 of symptoms    Recently did a zio patch 1 week thru her PCP office as she had reported palpitations.     Denies fevers.     Also noticed a lump in her left inguinal region yesterday.     No open wounds/infection.     No drainage of the rash    Patient Active Problem List    Diagnosis Date Noted     Migraine without aura and without status migrainosus, not intractable 11/11/2019     Priority: Medium     Dysmenorrhea 11/11/2019     Priority: Medium     Gastroesophageal reflux disease, esophagitis presence not specified 11/11/2019     Priority: Medium     IMO Regulatory Load OCT 2020         Current Outpatient Medications   Medication     calcium carbonate-vitamin D (OS-AMERICA) 500-400 MG-UNIT tablet     esomeprazole (NEXIUM) 40 MG DR capsule     nortriptyline (PAMELOR) 25 MG capsule     SUMAtriptan (IMITREX) 25 MG tablet     valACYclovir (VALTREX) 1000 mg tablet     acetaminophen (TYLENOL) 500 MG tablet     No current facility-administered medications for this visit.           Objective    /62 (BP Location: Right arm, Patient Position: Chair, Cuff Size: Adult Regular)   Pulse 94   Temp 98.3  F (36.8  C) (Oral)   Wt 61.2 kg (135 lb)   LMP 04/11/2022 (Exact Date)   SpO2 100%   Breastfeeding No   BMI 25.09 kg/m    Physical Exam      Anus: sparing of  Buttocks: blistering rash of the left buttocks area  Groin: one isolated self reported discomfort of an inguinal node, no rash of the pelvic anterior pelvic region    No results found for any visits on 04/22/22.                  The use of Dragon/Berg[employer] services may have been used to construct the content in this note; any grammatical or spelling errors are non-intentional. Please contact the author of this note directly if you are in need of any clarification.

## 2022-04-23 NOTE — PATIENT INSTRUCTIONS
Valtrex take as prescribed for the full week    Tylenol as needed for discomfort 650mg every 4-6 hours

## 2022-05-09 ENCOUNTER — MYC MEDICAL ADVICE (OUTPATIENT)
Dept: FAMILY MEDICINE | Facility: CLINIC | Age: 37
End: 2022-05-09
Payer: COMMERCIAL

## 2022-05-10 NOTE — TELEPHONE ENCOUNTER
Please see my chart message below     Please review and advise     Thank you     Khushboo Herring RN, BSN  [place] [facility]

## 2022-05-10 NOTE — TELEPHONE ENCOUNTER
Would encourage virtual visit for further discussion.      I am not able to view Zio patch monitor results - would encourage patient to have these prior to appointment.      - Keesha, CNP

## 2022-05-11 NOTE — TELEPHONE ENCOUNTER
Patient will call [facility] to see what the hold up is in sending us the results.  She will then schedule a virtual visit through her [facility]

## 2022-05-24 ENCOUNTER — VIRTUAL VISIT (OUTPATIENT)
Dept: FAMILY MEDICINE | Facility: CLINIC | Age: 37
End: 2022-05-24
Payer: COMMERCIAL

## 2022-05-24 DIAGNOSIS — R00.2 PALPITATIONS: Primary | ICD-10-CM

## 2022-05-24 PROBLEM — G43.009 MIGRAINE WITHOUT AURA AND WITHOUT STATUS MIGRAINOSUS, NOT INTRACTABLE: Status: ACTIVE | Noted: 2019-11-11

## 2022-05-24 PROCEDURE — 99214 OFFICE O/P EST MOD 30 MIN: CPT | Mod: GT | Performed by: NURSE PRACTITIONER

## 2022-05-24 NOTE — PROGRESS NOTES
"Consuelo is a 37 year old who is being evaluated via a billable video visit.      How would you like to obtain your AVS? MyChart  If the video visit is dropped, the invitation should be resent by: Text to cell phone: [phone]  Will anyone else be joining your video visit? No    Video Start Time: 5:21 PM    Assessment & Plan     Consuelo was seen today for recheck.    Diagnoses and all orders for this visit:    Palpitations  Acute onset of palpitations on 4/21/22, +Family history of SVT, requiring ablation.    Zio patch monitor completed from 4/14/222 to 4/20/22.   HR range , average HR 84.   Underlying rhythm Sinus Rhythm.  Isolated SVEs were rare.   Frequent symptoms, occasional PVC's (3.2%).    Patient with continued palpitations, plan further consultation with cardiology.    -     Adult Cardiology Eval  Referral; Future    30 minutes spent on the date of the encounter doing chart review, history and exam, documentation and further activities per the note       BMI:   Estimated body mass index is 25.09 kg/m  as calculated from the following:    Height as of 12/3/21: 1.562 m (5' 1.5\").    Weight as of 4/22/22: 61.2 kg (135 lb).     Return in about 4 weeks (around 6/21/2022) for Cardiology consultation.    Kasey Larios, OXA[facility]      Consuelo is a 37 year old who presents for the following health issues:         History of Present Illness     Patient reports that in March - noticed at both resting and with exercising - \"hard heart beat - intense feeling\".    Had a day where she had a higher heart rate and by the end of the day her chest was sore.    Was seen at [facility] the next day.  Zio patch x7 days was completed.    Is still noticing palpitations throughout the day.  Notices at night that symptoms will calm down slightly.    Symptoms continue to be apparent.  Has decreased exercise due to persistent symptoms.   Nervous with " "running due HR increasing to the 170's.      Since that time has been having more frontal lobe headaches, 3-4 times weekly (not migraine headaches).   Headaches were seeming to start around 11 a.m. and then some were in the afternoon.    LMP: 5/3/22 -   Normal cycle, 2 days after it ended had spotting for 2 days.      Mother with history of SVT, requiring an ablation.      Shingles diagnosis in April 2022.  Was able to start Valtrex.        Is not concerned about significant anxiety.  No current concerns.      JAMES-7 SCORE 3/24/2020 11/12/2020   Total Score 14 (moderate anxiety) -   Total Score 14 0     PHQ 11/12/2020   PHQ-9 Total Score 3   Q9: Thoughts of better off dead/self-harm past 2 weeks Not at all         Headaches:   Since the patient's last clinic visit, headaches are: no change  The patient is getting headaches:  Headaches 3-4 days a week. Migranes 2-3 per month  She is not able to do normal daily activities when she has a migraine.  The patient is taking the following rescue/relief medications:  Ibuprofen (Advil, Motrin) and sumatriptan (Imitrex)   Patient states \"The relief is inconsistent\" from the rescue/relief medications.   The patient is taking the following medications to prevent migraines:  Other  In the past 4 weeks, the patient has gone to an Urgent Care or Emergency Room 0 times times due to headaches.    Reason for visit:  Concern over multiple health issues over the past couple of months  Symptom onset:  More than a month  Symptoms include:  Contiued hearth palpitations, headaches, lightheadedness after crouching/bending, off-cycle spotting, slight weight gain  Symptom intensity:  Moderate  Symptom progression:  Staying the same  Had these symptoms before:  No  What makes it worse:  Exercising/physical activity make my heart rate rapidly increase, dont have to do a lot to reach max heart rate. Ida currently stopped exercising out of fear of complications  What makes it better:  No. Tylenol " or Ibuprofen can help with the headaches, but there is nothing that changes my heart palpitations    She eats 2-3 servings of fruits and vegetables daily.She consumes 1 sweetened beverage(s) daily.She exercises with enough effort to increase her heart rate 30 to 60 minutes per day.  She exercises with enough effort to increase her heart rate 3 or less days per week.   She is taking medications regularly.    Review of Systems   Constitutional, HEENT, cardiovascular, pulmonary, gi and gu systems are negative, except as otherwise noted.      Objective           Vitals:  No vitals were obtained today due to virtual visit.    Physical Exam     GENERAL: Healthy, alert and no distress  EYES: Eyes grossly normal to inspection.  No discharge or erythema, or obvious scleral/conjunctival abnormalities.  RESP: No audible wheeze, cough, or visible cyanosis.  No visible retractions or increased work of breathing.    SKIN: Visible skin clear. No significant rash, abnormal pigmentation or lesions.  NEURO: Cranial nerves grossly intact.  Mentation and speech appropriate for age.  PSYCH: Mentation appears normal, affect normal/bright, judgement and insight intact, normal speech and appearance well-groomed.      Video-Visit Details    Type of service:  Video Visit    Video End Time:5:40 PM    Originating Location (pt. Location): Home    Distant Location (provider location):  [facility]     Platform used for Video Visit: [employer]

## 2022-06-23 ENCOUNTER — OFFICE VISIT (OUTPATIENT)
Dept: CARDIOLOGY | Facility: CLINIC | Age: 37
End: 2022-06-23
Payer: COMMERCIAL

## 2022-06-23 VITALS
HEIGHT: 62 IN | DIASTOLIC BLOOD PRESSURE: 65 MMHG | OXYGEN SATURATION: 100 % | HEART RATE: 88 BPM | BODY MASS INDEX: 25.21 KG/M2 | SYSTOLIC BLOOD PRESSURE: 99 MMHG | WEIGHT: 137 LBS

## 2022-06-23 DIAGNOSIS — R00.2 PALPITATIONS: ICD-10-CM

## 2022-06-23 PROCEDURE — 99203 OFFICE O/P NEW LOW 30 MIN: CPT | Performed by: INTERNAL MEDICINE

## 2022-06-23 RX ORDER — MULTIPLE VITAMINS W/ MINERALS TAB 9MG-400MCG
1 TAB ORAL DAILY
COMMUNITY
End: 2024-03-08

## 2022-06-23 NOTE — PROGRESS NOTES
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    Baseline ECG reported by Dr. Celestino Lomax less than 2 months ago.  It was normal.  Event monitor showed PVCs, 3.2% burden with 10 seconds of bigeminy and trigeminy.  Was symptomatic and pressed the button more than 20 times and all of them correlated with PVCs    No history of anemia or other metabolic disturbances.  Does have migraines and takes Imitrex as needed, no more than once every 2 weeks or so.  On nortriptyline for migraine prophylaxis    No diabetes hypertension or other cardiac risk factors.  Does not smoke drink or abuse drugs.  Does not use excessive caffeinated products.    Has palpitations but no exertional chest discomfort shortness of breath or other symptoms.    Family history is notable for mother having SVT ablation.    Cardiac examination is normal.    Electrolytes from 6 months ago was TSH was normal.  Hemoglobin normal as well.    Suspect this lady is having benign PVCs.  [place] is not very high.  I advised her that she can start exercising.  She has not been exercising for fear of having palpitations    I anticipate that the PVCs were suppressed with exercise.  If this is the case then I do not think in the further action is necessary.  Perhaps her use of Imitrex as needed can precipitate PVCs but she has not noted any association.    We will keep her abreast of test results and arrange follow-up as necessary.    Orders Placed This Encounter   Procedures     Exercise Stress Test (Stress ECG)       Orders Placed This Encounter   Medications     multivitamin w/minerals (THERA-VIT-M) tablet     Sig: Take 1 tablet by mouth daily From [place] combination of Calcium, Vitamin D, Magnesium and Zinc. Also takes general multivitamin.       Encounter Diagnosis   Name Primary?     Palpitations        CURRENT MEDICATIONS:  Current Outpatient Medications   Medication Sig Dispense  Refill     esomeprazole (NEXIUM) 40 MG DR capsule Take 1 capsule (40 mg) by mouth every morning (before breakfast) Take 30-60 minutes before eating. 90 capsule 3     multivitamin w/minerals (THERA-VIT-M) tablet Take 1 tablet by mouth daily From [place] combination of Calcium, Vitamin D, Magnesium and Zinc. Also takes general multivitamin.       nortriptyline (PAMELOR) 25 MG capsule Take 1 capsule (25 mg) by mouth At Bedtime 90 capsule 3     SUMAtriptan (IMITREX) 25 MG tablet Take 1 tablet (25 mg) by mouth at onset of headache for migraine 18 tablet 1       ALLERGIES     Allergies   Allergen Reactions     Ceftin [Cefuroxime] GI Disturbance     [place]-Related Products GI Disturbance     Dairy Digestive Diarrhea, GI Disturbance and Headache     Doxycycline Other (See Comments)     Racing heart, itchy scalp, tingling     Levaquin [Levofloxacin] GI Disturbance     Nitrofurantoin Other (See Comments)     Tingling, racing heart, itchy scalp     Sulfa Drugs Hives     Wheat Gluten [Gluten Meal] GI Disturbance     Zithromax [Azithromycin] Hives       PAST MEDICAL HISTORY:  Past Medical History:   Diagnosis Date     GERD (gastroesophageal reflux disease)      History of cholecystectomy 2016       PAST SURGICAL HISTORY:  Past Surgical History:   Procedure Laterality Date     CHOLECYSTECTOMY         FAMILY HISTORY:  Family History   Problem Relation Age of Onset     Hypertension Mother      Osteopenia Mother      Arthritis Mother      Cerebrovascular Disease Mother      Cancer Maternal Grandmother        SOCIAL HISTORY:  Social History     Socioeconomic History     Marital status:      Spouse name: None     Number of children: None     Years of education: None     Highest education level: None   Tobacco Use     Smoking status: Former Smoker     Packs/day: 0.00     Quit date: 2007     Years since quittin.6     Smokeless tobacco: Never Used   Substance and Sexual Activity     Alcohol use: Yes      "Comment: 1 TIME PER MONTH      Drug use: Never     Sexual activity: Yes     Partners: Male     Birth control/protection: Inserts/Ring       Review of Systems:  Skin:  Negative     Eyes:  Positive for glasses  ENT:  Positive for nasal congestion  Respiratory:  Positive for cough  Cardiovascular:    Positive for;palpitations;lightheadedness;exercise intolerance;fatigue  Gastroenterology: Positive for heartburn;reflux  Genitourinary:  Positive for retention  Musculoskeletal:  Negative    Neurologic:  Positive for migraine headaches;headaches  Psychiatric:  Negative    Heme/Lymph/Imm:  Positive for allergies  Endocrine:  Negative      Physical Exam:  Vitals: BP 99/65   Pulse 88   Ht 1.562 m (5' 1.5\")   Wt 62.1 kg (137 lb)   SpO2 100%   BMI 25.47 kg/m      Constitutional:           Skin:             Head:           Eyes:           Lymph:      ENT:           Neck:           Respiratory:            Cardiac:                                                           GI:           Extremities and Muscular Skeletal:                 Neurological:           Psych:           Recent Lab Results:  LIPID RESULTS:  Lab Results   Component Value Date    CHOL 128 12/03/2021    CHOL 97 02/28/2017    HDL 81 12/03/2021    HDL 46 02/28/2017    LDL 40 12/03/2021    LDL 42 02/28/2017    TRIG 34 12/03/2021    TRIG 43 02/28/2017       LIVER ENZYME RESULTS:  Lab Results   Component Value Date    AST 11 12/03/2021    AST 31 03/30/2021    ALT 18 12/03/2021    ALT 39 03/30/2021       CBC RESULTS:  No results found for: WBC, RBC, HGB, HCT, MCV, MCH, MCHC, RDW, PLT    BMP RESULTS:  Lab Results   Component Value Date     12/03/2021    POTASSIUM 3.6 12/03/2021    POTASSIUM 4.1 02/21/2017    CHLORIDE 106 12/03/2021    CO2 23 12/03/2021    ANIONGAP 8 12/03/2021    GLC 83 12/03/2021    GLC 86 02/28/2017    BUN 12 12/03/2021    CR 0.66 12/03/2021    CR 0.67 02/21/2017    GFRESTIMATED >90 12/03/2021    GFRESTIMATED >59 02/21/2017    GFRESTBLACK " >59 02/21/2017    AMERICA 9.1 12/03/2021        A1C RESULTS:  No results found for: A1C    INR RESULTS:  No results found for: INR        CC  Kasey Larios, APRN CNP  [address] [place] [address]

## 2022-06-23 NOTE — LETTER
6/23/2022    Kasey Larios, APRN CNP  [address] [facility] [address]    RE: Consuelo Cyr       Dear Colleague,     I had the pleasure of seeing Consuelo Cyr in the [facility].  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    Baseline ECG reported by Dr. Celestino Lomax less than 2 months ago.  It was normal.  Event monitor showed PVCs, 3.2% burden with 10 seconds of bigeminy and trigeminy.  Was symptomatic and pressed the button more than 20 times and all of them correlated with PVCs    No history of anemia or other metabolic disturbances.  Does have migraines and takes Imitrex as needed, no more than once every 2 weeks or so.  On nortriptyline for migraine prophylaxis    No diabetes hypertension or other cardiac risk factors.  Does not smoke drink or abuse drugs.  Does not use excessive caffeinated products.    Has palpitations but no exertional chest discomfort shortness of breath or other symptoms.    Family history is notable for mother having SVT ablation.    Cardiac examination is normal.    Electrolytes from 6 months ago was TSH was normal.  Hemoglobin normal as well.    Suspect this lady is having benign PVCs.  [place] is not very high.  I advised her that she can start exercising.  She has not been exercising for fear of having palpitations    I anticipate that the PVCs were suppressed with exercise.  If this is the case then I do not think in the further action is necessary.  Perhaps her use of Imitrex as needed can precipitate PVCs but she has not noted any association.    We will keep her abreast of test results and arrange follow-up as necessary.    Orders Placed This Encounter   Procedures     Exercise Stress Test (Stress ECG)       Orders Placed This Encounter   Medications     multivitamin w/minerals (THERA-VIT-M) tablet     Sig: Take 1 tablet by mouth daily From [place]  combination of Calcium, Vitamin D, Magnesium and Zinc. Also takes general multivitamin.       Encounter Diagnosis   Name Primary?     Palpitations        CURRENT MEDICATIONS:  Current Outpatient Medications   Medication Sig Dispense Refill     esomeprazole (NEXIUM) 40 MG DR capsule Take 1 capsule (40 mg) by mouth every morning (before breakfast) Take 30-60 minutes before eating. 90 capsule 3     multivitamin w/minerals (THERA-VIT-M) tablet Take 1 tablet by mouth daily From [place] combination of Calcium, Vitamin D, Magnesium and Zinc. Also takes general multivitamin.       nortriptyline (PAMELOR) 25 MG capsule Take 1 capsule (25 mg) by mouth At Bedtime 90 capsule 3     SUMAtriptan (IMITREX) 25 MG tablet Take 1 tablet (25 mg) by mouth at onset of headache for migraine 18 tablet 1       ALLERGIES     Allergies   Allergen Reactions     Ceftin [Cefuroxime] GI Disturbance     [place]-Related Products GI Disturbance     Dairy Digestive Diarrhea, GI Disturbance and Headache     Doxycycline Other (See Comments)     Racing heart, itchy scalp, tingling     Levaquin [Levofloxacin] GI Disturbance     Nitrofurantoin Other (See Comments)     Tingling, racing heart, itchy scalp     Sulfa Drugs Hives     Wheat Gluten [Gluten Meal] GI Disturbance     Zithromax [Azithromycin] Hives       PAST MEDICAL HISTORY:  Past Medical History:   Diagnosis Date     GERD (gastroesophageal reflux disease)      History of cholecystectomy 03/18/2016       PAST SURGICAL HISTORY:  Past Surgical History:   Procedure Laterality Date     CHOLECYSTECTOMY  2015       FAMILY HISTORY:  Family History   Problem Relation Age of Onset     Hypertension Mother      Osteopenia Mother      Arthritis Mother      Cerebrovascular Disease Mother      Cancer Maternal Grandmother        SOCIAL HISTORY:  Social History     Socioeconomic History     Marital status:      Spouse name: None     Number of children: None     Years of education: None     Highest education  "level: None   Tobacco Use     Smoking status: Former Smoker     Packs/day: 0.00     Quit date: 2007     Years since quittin.6     Smokeless tobacco: Never Used   Substance and Sexual Activity     Alcohol use: Yes     Comment: 1 TIME PER MONTH      Drug use: Never     Sexual activity: Yes     Partners: Male     Birth control/protection: Inserts/Ring       Review of Systems:  Skin:  Negative     Eyes:  Positive for glasses  ENT:  Positive for nasal congestion  Respiratory:  Positive for cough  Cardiovascular:    Positive for;palpitations;lightheadedness;exercise intolerance;fatigue  Gastroenterology: Positive for heartburn;reflux  Genitourinary:  Positive for retention  Musculoskeletal:  Negative    Neurologic:  Positive for migraine headaches;headaches  Psychiatric:  Negative    Heme/Lymph/Imm:  Positive for allergies  Endocrine:  Negative      Physical Exam:  Vitals: BP 99/65   Pulse 88   Ht 1.562 m (5' 1.5\")   Wt 62.1 kg (137 lb)   SpO2 100%   BMI 25.47 kg/m      Constitutional:           Skin:             Head:           Eyes:           Lymph:      ENT:           Neck:           Respiratory:            Cardiac:                                                           GI:           Extremities and Muscular Skeletal:                 Neurological:           Psych:           Recent Lab Results:  LIPID RESULTS:  Lab Results   Component Value Date    CHOL 128 2021    CHOL 97 2017    HDL 81 2021    HDL 46 2017    LDL 40 2021    LDL 42 2017    TRIG 34 2021    TRIG 43 2017       LIVER ENZYME RESULTS:  Lab Results   Component Value Date    AST 11 2021    AST 31 2021    ALT 18 2021    ALT 39 2021       CBC RESULTS:  No results found for: WBC, RBC, HGB, HCT, MCV, MCH, MCHC, RDW, PLT    BMP RESULTS:  Lab Results   Component Value Date     2021    POTASSIUM 3.6 2021    POTASSIUM 4.1 2017    CHLORIDE 106 2021    " CO2 23 12/03/2021    ANIONGAP 8 12/03/2021    GLC 83 12/03/2021    GLC 86 02/28/2017    BUN 12 12/03/2021    CR 0.66 12/03/2021    CR 0.67 02/21/2017    GFRESTIMATED >90 12/03/2021    GFRESTIMATED >59 02/21/2017    GFRESTBLACK >59 02/21/2017    [place]ER[place] 9.1 12/03/2021        A1C RESULTS:  No results found for: A1C    INR RESULTS:  No results found for: INR        CC  Kasey Larios, APRN CNP  [address] [place] [address]    Thank you for allowing me to participate in the care of your patient.      Sincerely,     DR DIANNE ALAS MD     [facility]

## 2022-07-29 ENCOUNTER — MYC MEDICAL ADVICE (OUTPATIENT)
Dept: FAMILY MEDICINE | Facility: CLINIC | Age: 37
End: 2022-07-29

## 2022-07-29 DIAGNOSIS — G43.009 MIGRAINE WITHOUT AURA AND WITHOUT STATUS MIGRAINOSUS, NOT INTRACTABLE: ICD-10-CM

## 2022-07-29 RX ORDER — SUMATRIPTAN 25 MG/1
25 TABLET, FILM COATED ORAL
Qty: 18 TABLET | Refills: 5 | Status: SHIPPED | OUTPATIENT
Start: 2022-07-29 | End: 2022-12-16

## 2022-07-29 NOTE — TELEPHONE ENCOUNTER
Routing refill request to provider for review/approval because:  Imitrex does not pass the [facility] refill protocol      Serotonin Agonists Failed 07/29/2022 10:33 AM   Protocol Details  Serotonin Agonist request needs review.        Last fill was 2/28/22 18 tablets and one refill    Thank you!    Nayely GARCIA RN   [facility] Triage

## 2022-09-18 ENCOUNTER — HEALTH MAINTENANCE LETTER (OUTPATIENT)
Age: 37
End: 2022-09-18

## 2022-12-13 ENCOUNTER — MYC MEDICAL ADVICE (OUTPATIENT)
Dept: FAMILY MEDICINE | Facility: CLINIC | Age: 37
End: 2022-12-13

## 2022-12-13 DIAGNOSIS — G43.009 MIGRAINE WITHOUT AURA AND WITHOUT STATUS MIGRAINOSUS, NOT INTRACTABLE: ICD-10-CM

## 2022-12-13 DIAGNOSIS — K21.9 GASTROESOPHAGEAL REFLUX DISEASE, UNSPECIFIED WHETHER ESOPHAGITIS PRESENT: Primary | ICD-10-CM

## 2022-12-16 DIAGNOSIS — K21.9 GASTROESOPHAGEAL REFLUX DISEASE, UNSPECIFIED WHETHER ESOPHAGITIS PRESENT: ICD-10-CM

## 2022-12-16 RX ORDER — NORTRIPTYLINE HCL 25 MG
25 CAPSULE ORAL AT BEDTIME
Qty: 90 CAPSULE | Refills: 3 | Status: SHIPPED | OUTPATIENT
Start: 2022-12-16 | End: 2023-12-29

## 2022-12-16 RX ORDER — ESOMEPRAZOLE MAGNESIUM 40 MG/1
40 CAPSULE, DELAYED RELEASE ORAL
Qty: 90 CAPSULE | Refills: 3 | Status: SHIPPED | OUTPATIENT
Start: 2022-12-16 | End: 2022-12-20

## 2022-12-16 RX ORDER — SUMATRIPTAN 25 MG/1
25 TABLET, FILM COATED ORAL
Qty: 18 TABLET | Refills: 5 | Status: SHIPPED | OUTPATIENT
Start: 2022-12-16 | End: 2023-12-29

## 2022-12-16 NOTE — TELEPHONE ENCOUNTER
Routing refill request to provider for review/approval because:  A break in medication  Pt using more than 8 per month    Karie GREEN RN

## 2022-12-20 RX ORDER — PANTOPRAZOLE SODIUM 40 MG/1
40 TABLET, DELAYED RELEASE ORAL DAILY
Qty: 90 TABLET | Refills: 3 | Status: SHIPPED | OUTPATIENT
Start: 2022-12-20 | End: 2023-01-10

## 2023-01-04 ASSESSMENT — ENCOUNTER SYMPTOMS
NERVOUS/ANXIOUS: 0
FREQUENCY: 0
DYSURIA: 0
HEMATURIA: 0
CHILLS: 0
BREAST MASS: 0
PALPITATIONS: 0
DIARRHEA: 0
PARESTHESIAS: 0
ABDOMINAL PAIN: 0
NAUSEA: 0
MYALGIAS: 0
WEAKNESS: 0
EYE PAIN: 0
JOINT SWELLING: 0
HEMATOCHEZIA: 0
SORE THROAT: 0
HEADACHES: 0
FEVER: 0
CONSTIPATION: 0
SHORTNESS OF BREATH: 1
DIZZINESS: 1
ARTHRALGIAS: 0
COUGH: 0
HEARTBURN: 1

## 2023-01-10 ENCOUNTER — OFFICE VISIT (OUTPATIENT)
Dept: FAMILY MEDICINE | Facility: CLINIC | Age: 38
End: 2023-01-10
Payer: COMMERCIAL

## 2023-01-10 VITALS
HEIGHT: 62 IN | HEART RATE: 76 BPM | BODY MASS INDEX: 27.23 KG/M2 | TEMPERATURE: 98.4 F | RESPIRATION RATE: 12 BRPM | WEIGHT: 148 LBS | OXYGEN SATURATION: 100 % | DIASTOLIC BLOOD PRESSURE: 60 MMHG | SYSTOLIC BLOOD PRESSURE: 102 MMHG

## 2023-01-10 DIAGNOSIS — Z13.1 SCREENING FOR DIABETES MELLITUS: ICD-10-CM

## 2023-01-10 DIAGNOSIS — G43.009 MIGRAINE WITHOUT AURA AND WITHOUT STATUS MIGRAINOSUS, NOT INTRACTABLE: ICD-10-CM

## 2023-01-10 DIAGNOSIS — Z00.00 ROUTINE GENERAL MEDICAL EXAMINATION AT A HEALTH CARE FACILITY: Primary | ICD-10-CM

## 2023-01-10 DIAGNOSIS — Z13.220 SCREENING FOR LIPID DISORDERS: ICD-10-CM

## 2023-01-10 LAB
ALBUMIN SERPL BCG-MCNC: 4.3 G/DL (ref 3.5–5.2)
ALP SERPL-CCNC: 81 U/L (ref 35–104)
ALT SERPL W P-5'-P-CCNC: 16 U/L (ref 10–35)
ANION GAP SERPL CALCULATED.3IONS-SCNC: 12 MMOL/L (ref 7–15)
AST SERPL W P-5'-P-CCNC: 17 U/L (ref 10–35)
BILIRUB SERPL-MCNC: 0.4 MG/DL
BUN SERPL-MCNC: 7.3 MG/DL (ref 6–20)
CALCIUM SERPL-MCNC: 9.5 MG/DL (ref 8.6–10)
CHLORIDE SERPL-SCNC: 104 MMOL/L (ref 98–107)
CHOLEST SERPL-MCNC: 111 MG/DL
CREAT SERPL-MCNC: 0.64 MG/DL (ref 0.51–0.95)
DEPRECATED HCO3 PLAS-SCNC: 25 MMOL/L (ref 22–29)
GFR SERPL CREATININE-BSD FRML MDRD: >90 ML/MIN/1.73M2
GLUCOSE SERPL-MCNC: 87 MG/DL (ref 70–99)
HDLC SERPL-MCNC: 60 MG/DL
LDLC SERPL CALC-MCNC: 39 MG/DL
NONHDLC SERPL-MCNC: 51 MG/DL
POTASSIUM SERPL-SCNC: 4 MMOL/L (ref 3.4–5.3)
PROT SERPL-MCNC: 7.3 G/DL (ref 6.4–8.3)
SODIUM SERPL-SCNC: 141 MMOL/L (ref 136–145)
TRIGL SERPL-MCNC: 59 MG/DL

## 2023-01-10 PROCEDURE — 36415 COLL VENOUS BLD VENIPUNCTURE: CPT | Performed by: NURSE PRACTITIONER

## 2023-01-10 PROCEDURE — 99395 PREV VISIT EST AGE 18-39: CPT | Performed by: NURSE PRACTITIONER

## 2023-01-10 PROCEDURE — 99213 OFFICE O/P EST LOW 20 MIN: CPT | Mod: 25 | Performed by: NURSE PRACTITIONER

## 2023-01-10 PROCEDURE — 80061 LIPID PANEL: CPT | Performed by: NURSE PRACTITIONER

## 2023-01-10 PROCEDURE — 80053 COMPREHEN METABOLIC PANEL: CPT | Performed by: NURSE PRACTITIONER

## 2023-01-10 RX ORDER — ESOMEPRAZOLE MAGNESIUM 40 MG/1
22 CAPSULE, DELAYED RELEASE ORAL
COMMUNITY

## 2023-01-10 RX ORDER — SUMATRIPTAN 25 MG/1
25 TABLET, FILM COATED ORAL
Qty: 18 TABLET | Refills: 5 | Status: CANCELLED | OUTPATIENT
Start: 2023-01-10

## 2023-01-10 RX ORDER — NORTRIPTYLINE HCL 25 MG
25 CAPSULE ORAL AT BEDTIME
Qty: 90 CAPSULE | Refills: 3 | Status: CANCELLED | OUTPATIENT
Start: 2023-01-10

## 2023-01-10 ASSESSMENT — ENCOUNTER SYMPTOMS
EYE PAIN: 0
SORE THROAT: 0
DIZZINESS: 1
DYSURIA: 0
HEARTBURN: 1
SHORTNESS OF BREATH: 1
FEVER: 0
ARTHRALGIAS: 0
WEAKNESS: 0
JOINT SWELLING: 0
NAUSEA: 0
COUGH: 0
PALPITATIONS: 0
FREQUENCY: 0
NERVOUS/ANXIOUS: 0
HEMATURIA: 0
MYALGIAS: 0
DIARRHEA: 0
CHILLS: 0
CONSTIPATION: 0
HEADACHES: 0
PARESTHESIAS: 0
HEMATOCHEZIA: 0
BREAST MASS: 0
ABDOMINAL PAIN: 0

## 2023-01-10 ASSESSMENT — PAIN SCALES - GENERAL: PAINLEVEL: NO PAIN (0)

## 2023-01-10 NOTE — PROGRESS NOTES
SUBJECTIVE:   CC: Consuelo is an 37 year old who presents for preventive health visit.   Patient has been advised of split billing requirements and indicates understanding: Yes  Healthy Habits:     Getting at least 3 servings of Calcium per day:  NO    Bi-annual eye exam:  Yes    Dental care twice a year:  Yes    Sleep apnea or symptoms of sleep apnea:  None    Diet:  Gluten-free/reduced and Other    Frequency of exercise:  None    Taking medications regularly:  Yes    Medication side effects:  Other    PHQ-2 Total Score: 1    Additional concerns today:  Yes    menstrual cycles getting shorter , lasting 5-7 days with cramps on days 1 and 3   Used to be 24-25 days, now 23 days.      No sudden urges to use the bathroom, but feels like she isn't emptying well.   Will lie down on left side and then has urge to use bathroom.    Onset a few months ago.  This is intermittent and only happening once in a while.    Worse when she is getting cycle.      Migraines are primarily well managed.    1 migraine every couple weeks.  Typically gets one with cycle or hormone changes (ovulation).    Triggers:  Dairy, casein, corn, gluten    Social:  , children are 14, 11 and 8       Today's PHQ-2 Score:   PHQ-2 ( 1999 [employer]) 1/4/2023   Q1: Little interest or pleasure in doing things 0   Q2: Feeling down, depressed or hopeless 1   PHQ-2 Score 1   PHQ-2 Total Score (12-17 Years)- Positive if 3 or more points; Administer PHQ-A if positive -   Q1: Little interest or pleasure in doing things Not at all   Q2: Feeling down, depressed or hopeless Several days   PHQ-2 Score 1           Social History     Tobacco Use     Smoking status: Former     Packs/day: 0.00     Years: 5.00     Pack years: 0.00     Types: Cigarettes     Quit date: 12/3/2007     Years since quitting: 15.1     Smokeless tobacco: Never   Substance Use Topics     Alcohol use: Not Currently     Comment: 1 TIME PER MONTH      If you drink alcohol do you typically have  >3 drinks per day or >7 drinks per week? No    Alcohol Use 1/10/2023   Prescreen: >3 drinks/day or >7 drinks/week? -   Prescreen: >3 drinks/day or >7 drinks/week? No       Reviewed orders with patient.  Reviewed health maintenance and updated orders accordingly - Yes  BP Readings from Last 3 Encounters:   01/10/23 102/60   06/23/22 99/65   04/22/22 118/62    Wt Readings from Last 3 Encounters:   01/10/23 67.1 kg (148 lb)   06/23/22 62.1 kg (137 lb)   04/22/22 61.2 kg (135 lb)                  Patient Active Problem List   Diagnosis     Migraine without aura and without status migrainosus, not intractable     Dysmenorrhea     Gastroesophageal reflux disease, esophagitis presence not specified     Heart palpitations     Past Surgical History:   Procedure Laterality Date     CHOLECYSTECTOMY  2015       Social History     Tobacco Use     Smoking status: Former     Packs/day: 0.00     Years: 5.00     Pack years: 0.00     Types: Cigarettes     Quit date: 12/3/2007     Years since quitting: 15.1     Smokeless tobacco: Never   Substance Use Topics     Alcohol use: Not Currently     Comment: 1 TIME PER MONTH      Family History   Problem Relation Age of Onset     Hypertension Mother      Osteopenia Mother      Arthritis Mother      Cerebrovascular Disease Mother      Anxiety Disorder Mother      Mental Illness Mother      Obesity Mother      Cancer Maternal Grandmother      Colon Cancer Maternal Grandmother      Obesity Maternal Grandmother          Current Outpatient Medications   Medication Sig Dispense Refill     esomeprazole (NEXIUM) 40 MG DR capsule        multivitamin w/minerals (THERA-VIT-M) tablet Take 1 tablet by mouth daily From [employer] combination of Calcium, Vitamin D, Magnesium and Zinc. Also takes general multivitamin.       nortriptyline (PAMELOR) 25 MG capsule Take 1 capsule (25 mg) by mouth At Bedtime 90 capsule 3     SUMAtriptan (IMITREX) 25 MG tablet Take 1 tablet (25 mg) by mouth at onset of headache for  migraine 18 tablet 5       Breast Cancer Screening:    FHS-7:   Breast CA Risk Assessment (FHS-7) 12/3/2021 1/4/2023   Did any of your first-degree relatives have breast or ovarian cancer? No No   Did any of your relatives have bilateral breast cancer? No No   Did any man in your family have breast cancer? No No   Did any woman in your family have breast and ovarian cancer? No No   Did any woman in your family have breast cancer before age 50 y? No No   Do you have 2 or more relatives with breast and/or ovarian cancer? No No   Do you have 2 or more relatives with breast and/or bowel cancer? No No       Patient under 40 years of age: Routine Mammogram Screening not recommended.   Pertinent mammograms are reviewed under the imaging tab.    History of abnormal Pap smear: NO - age 30-65 PAP every 5 years with negative HPV co-testing recommended  PAP / HPV Latest Ref Rng & Units 11/12/2020   PAP (Historical) - NIL   HPV16 NEG:Negative Negative   HPV18 NEG:Negative Negative   HRHPV NEG:Negative Negative     Reviewed and updated as needed this visit by clinical staff   Tobacco  Allergies  Meds              Reviewed and updated as needed this visit by Provider     Meds             Past Medical History:   Diagnosis Date     Arthritis Early 2000s    I have it in my knees and low back     GERD (gastroesophageal reflux disease)      History of cholecystectomy 03/18/2016      Past Surgical History:   Procedure Laterality Date     CHOLECYSTECTOMY  2015       Review of Systems   Constitutional: Negative for chills and fever.   HENT: Negative for congestion, ear pain, hearing loss and sore throat.    Eyes: Negative for pain and visual disturbance.   Respiratory: Positive for shortness of breath. Negative for cough.    Cardiovascular: Negative for chest pain, palpitations and peripheral edema.   Gastrointestinal: Positive for heartburn. Negative for abdominal pain, constipation, diarrhea, hematochezia and nausea.   Breasts:   "Negative for tenderness, breast mass and discharge.   Genitourinary: Positive for urgency. Negative for dysuria, frequency, genital sores, hematuria, pelvic pain, vaginal bleeding and vaginal discharge.   Musculoskeletal: Negative for arthralgias, joint swelling and myalgias.   Skin: Negative for rash.   Neurological: Positive for dizziness. Negative for weakness, headaches and paresthesias.   Psychiatric/Behavioral: Positive for mood changes. The patient is not nervous/anxious.           OBJECTIVE:   /60   Pulse 76   Temp 98.4  F (36.9  C) (Tympanic)   Resp 12   Ht 1.562 m (5' 1.5\")   Wt 67.1 kg (148 lb)   LMP 01/01/2023 (Exact Date)   SpO2 100%   BMI 27.51 kg/m       Physical Exam     GENERAL: healthy, alert and no distress  EYES: Eyes grossly normal to inspection, PERRL and conjunctivae and sclerae normal  HENT: ear canals and TM's normal, nose and mouth without ulcers or lesions  NECK: no adenopathy, no asymmetry, masses, or scars and thyroid normal to palpation  RESP: lungs clear to auscultation - no rales, rhonchi or wheezes  BREAST: normal without masses, tenderness or nipple discharge and no palpable axillary masses or adenopathy  CV: regular rate and rhythm, normal S1 S2, no S3 or S4, no murmur, click or rub, no peripheral edema  ABDOMEN: soft, nontender, no hepatosplenomegaly, no masses and bowel sounds normal  MS: no gross musculoskeletal defects noted, no edema  SKIN: no suspicious lesions or rashes  NEURO: Normal strength and tone, mentation intact and speech normal  PSYCH: mentation appears normal, affect normal/bright      ASSESSMENT/PLAN:     Consuelo was seen today for physical.    Diagnoses and all orders for this visit:    Routine general medical examination at a health care facility    Screening for diabetes mellitus  -     Lipid panel reflex to direct LDL Fasting    Screening for lipid disorders  -     Comprehensive metabolic panel (BMP + Alb, Alk Phos, ALT, AST, Total. Bili, " "TP)    Migraine without aura and without status migrainosus, not intractable  Stable on nortriptyline 25 mg at bedtime and Sumatriptan as needed.   Refills completed December 2022.      Other orders  -     REVIEW OF HEALTH MAINTENANCE PROTOCOL ORDERS            COUNSELING:  Reviewed preventive health counseling, as reflected in patient instructions      BMI:   Estimated body mass index is 27.51 kg/m  as calculated from the following:    Height as of this encounter: 1.562 m (5' 1.5\").    Weight as of this encounter: 67.1 kg (148 lb).         She reports that she quit smoking about 15 years ago. Her smoking use included cigarettes. She has never used smokeless tobacco.      Kasey Larios, OXANA CNP  [facility] [place]  "

## 2023-01-11 NOTE — RESULT ENCOUNTER NOTE
Judson Cordero,     -Cholesterol levels (LDL,HDL, Triglycerides) are normal.    -Liver and gallbladder tests are normal (ALT,AST, Alk phos, bilirubin), kidney function is normal (Cr, GFR), sodium is normal, potassium is normal, calcium is normal, glucose is normal.      Please send a [employer] message or call [phone]  if you have any questions.      Kasey Larios, APRN, CNP  [facility]    If you have further questions about the interpretation of your labs, [email or web address] is a good website to check out for further information.

## 2023-12-28 DIAGNOSIS — G43.009 MIGRAINE WITHOUT AURA AND WITHOUT STATUS MIGRAINOSUS, NOT INTRACTABLE: ICD-10-CM

## 2023-12-29 RX ORDER — NORTRIPTYLINE HCL 25 MG
25 CAPSULE ORAL AT BEDTIME
Qty: 90 CAPSULE | Refills: 0 | Status: SHIPPED | OUTPATIENT
Start: 2023-12-29 | End: 2024-03-08

## 2023-12-29 RX ORDER — SUMATRIPTAN 25 MG/1
25 TABLET, FILM COATED ORAL
Qty: 10 TABLET | Refills: 0 | Status: SHIPPED | OUTPATIENT
Start: 2023-12-29 | End: 2024-02-08

## 2024-02-07 DIAGNOSIS — G43.009 MIGRAINE WITHOUT AURA AND WITHOUT STATUS MIGRAINOSUS, NOT INTRACTABLE: ICD-10-CM

## 2024-02-08 RX ORDER — SUMATRIPTAN 25 MG/1
25 TABLET, FILM COATED ORAL
Qty: 10 TABLET | Refills: 0 | Status: SHIPPED | OUTPATIENT
Start: 2024-02-08 | End: 2024-03-08

## 2024-02-25 ENCOUNTER — HEALTH MAINTENANCE LETTER (OUTPATIENT)
Age: 39
End: 2024-02-25

## 2024-03-07 SDOH — HEALTH STABILITY: PHYSICAL HEALTH: ON AVERAGE, HOW MANY DAYS PER WEEK DO YOU ENGAGE IN MODERATE TO STRENUOUS EXERCISE (LIKE A BRISK WALK)?: 0 DAYS

## 2024-03-07 SDOH — HEALTH STABILITY: PHYSICAL HEALTH: ON AVERAGE, HOW MANY MINUTES DO YOU ENGAGE IN EXERCISE AT THIS LEVEL?: 0 MIN

## 2024-03-07 ASSESSMENT — SOCIAL DETERMINANTS OF HEALTH (SDOH): HOW OFTEN DO YOU GET TOGETHER WITH FRIENDS OR RELATIVES?: PATIENT DECLINED

## 2024-03-07 NOTE — COMMUNITY RESOURCES LIST (ENGLISH)
03/07/2024    [employer]  N/A  For questions about this resource list or additional care needs, please contact your primary care clinic or care manager.  Phone: [phone]   Email: N/A   Address: [address] [place] [id number]   Hours: N/A        Exercise and Recreation       Gym or workout facility  1  [facility] Distance: 5.67 miles      In-Person   [address] [place] [address]  Language: English, Qatari  Hours: Mon - Fri 5:00 AM - 9:00 PM , Sat 6:00 AM - 8:00 PM , Sun 8:00 AM - 8:00 PM  Fees: Self Pay   Phone: [phone] Email: [email or web address] Website: [email or web address]     2  [facility] Distance: 6.76 miles      In-Person   [address] [place] [address]  Language: English, Qatari  Hours: Mon - Sun Open 24 Hours  Fees: Insurance, Self Pay, Sliding Fee   Phone: [phone]) [phone] Email: [email or web address] [email or web address] Website: [email or web address]          Important Numbers & Websites       Emergency Services   911  [facility]   311  Poison Control   (754) 103-8695  Suicide Prevention Lifeline   (799) 460-8131 (TALK)  Child Abuse Hotline   (635) 150-1238 (4-A-Child)  Sexual Assault Hotline   (141) 166-4699 (HOPE)  National Runaway Safeline   (675) 150-7689 (RUNAWAY)  All-Options Talkline   (546) 862-1837  Substance Abuse Referral   (571) 825-4565 (HELP)

## 2024-03-08 ENCOUNTER — OFFICE VISIT (OUTPATIENT)
Dept: FAMILY MEDICINE | Facility: CLINIC | Age: 39
End: 2024-03-08
Payer: COMMERCIAL

## 2024-03-08 VITALS
RESPIRATION RATE: 18 BRPM | HEIGHT: 62 IN | WEIGHT: 152 LBS | SYSTOLIC BLOOD PRESSURE: 98 MMHG | OXYGEN SATURATION: 100 % | BODY MASS INDEX: 27.97 KG/M2 | HEART RATE: 88 BPM | TEMPERATURE: 96.4 F | DIASTOLIC BLOOD PRESSURE: 60 MMHG

## 2024-03-08 DIAGNOSIS — G43.109 MIGRAINE WITH AURA AND WITHOUT STATUS MIGRAINOSUS, NOT INTRACTABLE: ICD-10-CM

## 2024-03-08 DIAGNOSIS — R10.2 PELVIC PAIN IN FEMALE: ICD-10-CM

## 2024-03-08 DIAGNOSIS — Z13.1 SCREENING FOR DIABETES MELLITUS: ICD-10-CM

## 2024-03-08 DIAGNOSIS — Z13.29 SCREENING FOR THYROID DISORDER: ICD-10-CM

## 2024-03-08 DIAGNOSIS — Z00.00 ROUTINE GENERAL MEDICAL EXAMINATION AT A HEALTH CARE FACILITY: Primary | ICD-10-CM

## 2024-03-08 DIAGNOSIS — Z13.220 SCREENING FOR LIPID DISORDERS: ICD-10-CM

## 2024-03-08 PROCEDURE — 99214 OFFICE O/P EST MOD 30 MIN: CPT | Mod: 25 | Performed by: NURSE PRACTITIONER

## 2024-03-08 PROCEDURE — 90471 IMMUNIZATION ADMIN: CPT | Performed by: NURSE PRACTITIONER

## 2024-03-08 PROCEDURE — 90715 TDAP VACCINE 7 YRS/> IM: CPT | Performed by: NURSE PRACTITIONER

## 2024-03-08 PROCEDURE — 99395 PREV VISIT EST AGE 18-39: CPT | Mod: 25 | Performed by: NURSE PRACTITIONER

## 2024-03-08 RX ORDER — SUMATRIPTAN 25 MG/1
25 TABLET, FILM COATED ORAL
Qty: 10 TABLET | Refills: 0 | Status: SHIPPED | OUTPATIENT
Start: 2024-03-08 | End: 2024-03-08

## 2024-03-08 RX ORDER — NORTRIPTYLINE HYDROCHLORIDE 50 MG/1
50 CAPSULE ORAL AT BEDTIME
Qty: 90 CAPSULE | Refills: 3 | Status: SHIPPED | OUTPATIENT
Start: 2024-03-08 | End: 2024-08-26

## 2024-03-08 RX ORDER — SUMATRIPTAN 50 MG/1
50 TABLET, FILM COATED ORAL
Qty: 10 TABLET | Refills: 5 | Status: SHIPPED | OUTPATIENT
Start: 2024-03-08 | End: 2024-04-30

## 2024-03-08 ASSESSMENT — ANXIETY QUESTIONNAIRES
6. BECOMING EASILY ANNOYED OR IRRITABLE: SEVERAL DAYS
1. FEELING NERVOUS, ANXIOUS, OR ON EDGE: NOT AT ALL
3. WORRYING TOO MUCH ABOUT DIFFERENT THINGS: NOT AT ALL
2. NOT BEING ABLE TO STOP OR CONTROL WORRYING: NOT AT ALL
GAD7 TOTAL SCORE: 1
5. BEING SO RESTLESS THAT IT IS HARD TO SIT STILL: NOT AT ALL
IF YOU CHECKED OFF ANY PROBLEMS ON THIS QUESTIONNAIRE, HOW DIFFICULT HAVE THESE PROBLEMS MADE IT FOR YOU TO DO YOUR WORK, TAKE CARE OF THINGS AT HOME, OR GET ALONG WITH OTHER PEOPLE: SOMEWHAT DIFFICULT
GAD7 TOTAL SCORE: 1
7. FEELING AFRAID AS IF SOMETHING AWFUL MIGHT HAPPEN: NOT AT ALL

## 2024-03-08 ASSESSMENT — PATIENT HEALTH QUESTIONNAIRE - PHQ9
5. POOR APPETITE OR OVEREATING: NOT AT ALL
SUM OF ALL RESPONSES TO PHQ QUESTIONS 1-9: 4

## 2024-03-08 NOTE — PATIENT INSTRUCTIONS
Preventive Care Advice   This is general advice given by our system to help you stay healthy. However, your care team may have specific advice just for you. Please talk to your care team about your preventive care needs.  Nutrition  Eat 5 or more servings of fruits and vegetables each day.  Try wheat bread, brown rice and whole grain pasta (instead of white bread, rice, and pasta).  Get enough calcium and vitamin D. Check the label on foods and aim for 100% of the RDA (recommended daily allowance).  Lifestyle  Exercise at least 150 minutes each week   (30 minutes a day, 5 days a week).  Do muscle strengthening activities 2 days a week. These help control your weight and prevent disease.  No smoking.  Wear sunscreen to prevent skin cancer.  Have a dental exam and cleaning every 6 months.  Yearly exams  See your health care team every year to talk about:  Any changes in your health.  Any medicines your care team has prescribed.  Preventive care, family planning, and ways to prevent chronic diseases.  Shots (vaccines)   HPV shots (up to age 26), if you've never had them before.  Hepatitis B shots (up to age 59), if you've never had them before.  COVID-19 shot: Get this shot when it's due.  Flu shot: Get a flu shot every year.  Tetanus shot: Get a tetanus shot every 10 years.  Pneumococcal, hepatitis A, and RSV shots: Ask your care team if you need these based on your risk.  Shingles shot (for age 50 and up).  General health tests  Diabetes screening:  Starting at age 35, Get screened for diabetes at least every 3 years.  If you are younger than age 35, ask your care team if you should be screened for diabetes.  Cholesterol test: At age 39, start having a cholesterol test every 5 years, or more often if advised.  Bone density scan (DEXA): At age 50, ask your care team if you should have this scan for osteoporosis (brittle bones).  Hepatitis C: Get tested at least once in your life.  STIs (sexually transmitted  infections)  Before age 24: Ask your care team if you should be screened for STIs.  After age 24: Get screened for STIs if you're at risk. You are at risk for STIs (including HIV) if:  You are sexually active with more than one person.  You don't use condoms every time.  You or a partner was diagnosed with a sexually transmitted infection.  If you are at risk for HIV, ask about PrEP medicine to prevent HIV.  Get tested for HIV at least once in your life, whether you are at risk for HIV or not.  Cancer screening tests  Cervical cancer screening: If you have a cervix, begin getting regular cervical cancer screening tests at age 21. Most people who have regular screenings with normal results can stop after age 65. Talk about this with your provider.  Breast cancer scan (mammogram): If you've ever had breasts, begin having regular mammograms starting at age 40. This is a scan to check for breast cancer.  Colon cancer screening: It is important to start screening for colon cancer at age 45.  Have a colonoscopy test every 10 years (or more often if you're at risk) Or, ask your provider about stool tests like a FIT test every year or Cologuard test every 3 years.  To learn more about your testing options, visit: [email or web address]/[email or web address].  For help making a decision, visit: https://bit.ly/yz52830.  Prostate cancer screening test: If you have a prostate and are age 55 to 69, ask your provider if you would benefit from a yearly prostate cancer screening test.  Lung cancer screening: If you are a current or former smoker age 50 to 80, ask your care team if ongoing lung cancer screenings are right for you.  For informational purposes only. Not to replace the advice of your health care provider. Copyright   2023 [employer]. All rights reserved. Clinically reviewed by the [facility] Transitions Program. [employer] 721940 - REV 01/24.

## 2024-03-08 NOTE — PROGRESS NOTES
"Preventive Care Visit  [facility] [place]  OXANA Grande CNP, Family Medicine  Mar 8, 2024    Assessment & Plan     Consuelo was seen today for physical.    Diagnoses and all orders for this visit:    Routine general medical examination at a health care facility  -     TDAP 10-64Y (ADACEL,BOOSTRIX)  -     REVIEW OF HEALTH MAINTENANCE PROTOCOL ORDERS  -     Comprehensive metabolic panel; Future  -     Lipid panel reflex to direct LDL Fasting; Future  -     TSH with free T4 reflex; Future    Screening for diabetes mellitus  -     Comprehensive metabolic panel; Future    Screening for lipid disorders  -     Lipid panel reflex to direct LDL Fasting; Future    Screening for thyroid disorder  -     TSH with free T4 reflex; Future    Migraine with aura and without status migrainosus, not intractable  Progressive, increasing frequency and intensity of migraines.   Will plan trial increase in Nortriptyline to 50 mg daily and continued triptan therapy (dose of Sumatriptan increased).   Encouraged further consultation with neurology.   -     nortriptyline (PAMELOR) 50 MG capsule; Take 1 capsule (50 mg) by mouth at bedtime  -     Adult Neurology  Referral; Future  -     SUMAtriptan (IMITREX) 50 MG tablet; Take 1 tablet (50 mg) by mouth at onset of headache for migraine    Pelvic pain in female  Cyclical lower abdominal pain around menstrual cycles, will plan further evaluation with pelvic ultrasound.    -     US Pelvic Complete with Transvaginal; Future    Other orders  -     PRIMARY CARE FOLLOW-UP SCHEDULING; Future        BMI  Estimated body mass index is 28.26 kg/m  as calculated from the following:    Height as of this encounter: 1.562 m (5' 1.5\").    Weight as of this encounter: 68.9 kg (152 lb).     Counseling  Appropriate preventive services were discussed with this patient.  Checklist reviewing preventive services available has been given to the patient.    Return in about 1 year " (around 3/8/2025) for Preventative Visit + Med Check .      Subjective   Consuelo is a 39 year old, presenting for the following:  Physical        3/8/2024     8:00 AM   Additional Questions   Roomed by Jelly        Health Care Directive  Patient does not have a Health Care Directive or Living Will:     HPI      Started with night sweats at 35 - variable, more present around cycle.  Skin has become more itchy.  Mood swings that are variable.  +Insomnia - tried some Delta-9 gummies which have been helpful.   Menstrual cycles are regular, lighter flow, but longer.    Difficulty with weight gain.  Increased work outs and wasn't able to lose weight.   Mother went through menopause in mid 50's.      Intense lower abdominal pain (not isolated to right side) - two days before menstrual cycle.     Right breast tenderness - associated with abdominal pain   History of clogged duct breastfeeding.    History of ovarian cyst.    New symptoms over the past 6-9 months.      Migraines are primarily well managed.  noted increase in migraines recently.    Stable on nortriptyline 25 mg at bedtime and Sumatriptan as needed.   1 migraine every week, increase in intensity. Typically gets one with cycle or hormone changes (ovulation).    Sumatriptan is working ok,  sometimes needs a second dose.   Triggers:  Dairy, casein, corn, gluten       Social:  , children are 15, 13 and 10          3/7/2024   General Health   How would you rate your overall physical health? Good   Feel stress (tense, anxious, or unable to sleep) Not at all         3/7/2024   Nutrition   Three or more servings of calcium each day? (!) NO   Diet: Other   If other, please elaborate: Gluten, casien and corn free   How many servings of fruit and vegetables per day? (!) 2-3   How many sweetened beverages each day? (!) 2         3/7/2024   Exercise   Days per week of moderate/strenous exercise 0 days   Average minutes spent exercising at this level 0 min   (!)  EXERCISE CONCERN      3/7/2024   Social Factors   Frequency of gathering with friends or relatives Patient declined   Worry food won't last until get money to buy more No   Food not last or not have enough money for food? No   Do you have housing?  Yes   Are you worried about losing your housing? No   Lack of transportation? No   Unable to get utilities (heat,electricity)? No         3/7/2024   Dental   Dentist two times every year? Yes         3/7/2024   TB Screening   Were you born outside of [place]?  No         Today's PHQ-2 Score:       3/7/2024    10:56 AM   PHQ-2 (  [employer])   Q1: Little interest or pleasure in doing things 0   Q2: Feeling down, depressed or hopeless 0   PHQ-2 Score 0   Q1: Little interest or pleasure in doing things Not at all   Q2: Feeling down, depressed or hopeless Not at all   PHQ-2 Score 0           3/7/2024   Substance Use   Alcohol more than 3/day or more than 7/wk No   Do you use any other substances recreationally? (!) CANNABIS PRODUCTS    (!) OTHER     Social History     Tobacco Use    Smoking status: Former     Packs/day: 0.00     Years: 5.00     Additional pack years: 0.00     Total pack years: 0.00     Types: Cigarettes     Quit date: 12/3/2007     Years since quittin.2    Smokeless tobacco: Never   Vaping Use    Vaping Use: Never used   Substance Use Topics    Alcohol use: Yes     Comment: Occasionally, might equate to 1-2 per month    Drug use: Never           2023   LAST FHS-7 RESULTS   1st degree relative breast or ovarian cancer No   Any relative bilateral breast cancer No   Any male have breast cancer No   Any ONE woman have BOTH breast AND ovarian cancer No   Any woman with breast cancer before 50yrs No   2 or more relatives with breast AND/OR ovarian cancer No   2 or more relatives with breast AND/OR bowel cancer No       Mammogram Screening - Patient under 40 years of age: Routine Mammogram Screening not recommended.         3/7/2024   STI Screening   New sexual  partner(s) since last STI/HIV test? No     History of abnormal Pap smear: NO - age 30-65 PAP every 5 years with negative HPV co-testing recommended        Latest Ref Rng & Units 2020    11:46 AM 2020    11:45 AM 2017    12:00 AM   PAP / HPV   PAP (Historical)  NIL      HPV 16 DNA NEG^Negative  Negative     HPV 18 DNA NEG^Negative  Negative     Other HR HPV NEG^Negative  Negative     PAP-ABSTRACT    See Scanned Document           This result is from an external source.           3/7/2024   Contraception/Family Planning   Questions about contraception or family planning No     Reviewed and updated as needed this visit by Provider     Meds                BP Readings from Last 3 Encounters:   24 98/60   01/10/23 102/60   22 99/65    Wt Readings from Last 3 Encounters:   24 68.9 kg (152 lb)   01/10/23 67.1 kg (148 lb)   22 62.1 kg (137 lb)                  Patient Active Problem List   Diagnosis    Migraine without aura and without status migrainosus, not intractable    Dysmenorrhea    Gastroesophageal reflux disease, esophagitis presence not specified    Heart palpitations     Past Surgical History:   Procedure Laterality Date    CHOLECYSTECTOMY         Social History     Tobacco Use    Smoking status: Former     Packs/day: 0.00     Years: 5.00     Additional pack years: 0.00     Total pack years: 0.00     Types: Cigarettes     Quit date: 12/3/2007     Years since quittin.2    Smokeless tobacco: Never   Substance Use Topics    Alcohol use: Yes     Comment: Occasionally, might equate to 1-2 per month     Family History   Problem Relation Age of Onset    Hypertension Mother     Osteopenia Mother     Arthritis Mother     Cerebrovascular Disease Mother     Anxiety Disorder Mother     Mental Illness Mother     Obesity Mother     Cancer Maternal Grandmother     Colon Cancer Maternal Grandmother     Obesity Maternal Grandmother     Cerebrovascular Disease Maternal Grandfather   "        Current Outpatient Medications   Medication Sig Dispense Refill    esomeprazole (NEXIUM) 40 MG DR capsule       nortriptyline (PAMELOR) 50 MG capsule Take 1 capsule (50 mg) by mouth at bedtime 90 capsule 3    SUMAtriptan (IMITREX) 50 MG tablet Take 1 tablet (50 mg) by mouth at onset of headache for migraine 10 tablet 5         Review of Systems  Constitutional, HEENT, cardiovascular, pulmonary, gi and gu systems are negative, except as otherwise noted.     Objective    Exam  BP 98/60   Pulse 88   Temp (!) 96.4  F (35.8  C) (Tympanic)   Resp 18   Ht 1.562 m (5' 1.5\")   Wt 68.9 kg (152 lb)   LMP 02/17/2024   SpO2 100%   BMI 28.26 kg/m     Estimated body mass index is 28.26 kg/m  as calculated from the following:    Height as of this encounter: 1.562 m (5' 1.5\").    Weight as of this encounter: 68.9 kg (152 lb).    Physical Exam    GENERAL: alert and no distress  EYES: Eyes grossly normal to inspection  HENT: ear canals and TM's normal, nose and mouth without ulcers or lesions  NECK: no adenopathy, no asymmetry, masses, or scars  RESP: lungs clear to auscultation - no rales, rhonchi or wheezes  CV: regular rate and rhythm, normal S1 S2, no S3 or S4, no murmur, click or rub, no peripheral edema  ABDOMEN: soft, nontender, no hepatosplenomegaly, no masses and bowel sounds normal  MS: no gross musculoskeletal defects noted, no edema  SKIN: no suspicious lesions or rashes  NEURO: Normal strength and tone, mentation intact and speech normal  PSYCH: mentation appears normal, affect normal/bright      Signed Electronically by: Kasey Larios, APRN CNP    "

## 2024-03-19 ENCOUNTER — LAB (OUTPATIENT)
Dept: LAB | Facility: CLINIC | Age: 39
End: 2024-03-19
Payer: COMMERCIAL

## 2024-03-19 DIAGNOSIS — Z00.00 ROUTINE GENERAL MEDICAL EXAMINATION AT A HEALTH CARE FACILITY: ICD-10-CM

## 2024-03-19 DIAGNOSIS — Z13.29 SCREENING FOR THYROID DISORDER: ICD-10-CM

## 2024-03-19 DIAGNOSIS — Z13.220 SCREENING FOR LIPID DISORDERS: ICD-10-CM

## 2024-03-19 DIAGNOSIS — Z13.1 SCREENING FOR DIABETES MELLITUS: ICD-10-CM

## 2024-03-19 LAB
ALBUMIN SERPL BCG-MCNC: 4.5 G/DL (ref 3.5–5.2)
ALP SERPL-CCNC: 83 U/L (ref 40–150)
ALT SERPL W P-5'-P-CCNC: 12 U/L (ref 0–50)
ANION GAP SERPL CALCULATED.3IONS-SCNC: 9 MMOL/L (ref 7–15)
AST SERPL W P-5'-P-CCNC: 15 U/L (ref 0–45)
BILIRUB SERPL-MCNC: 0.5 MG/DL
BUN SERPL-MCNC: 10.2 MG/DL (ref 6–20)
CALCIUM SERPL-MCNC: 9.5 MG/DL (ref 8.6–10)
CHLORIDE SERPL-SCNC: 103 MMOL/L (ref 98–107)
CHOLEST SERPL-MCNC: 120 MG/DL
CREAT SERPL-MCNC: 0.69 MG/DL (ref 0.51–0.95)
DEPRECATED HCO3 PLAS-SCNC: 27 MMOL/L (ref 22–29)
EGFRCR SERPLBLD CKD-EPI 2021: >90 ML/MIN/1.73M2
FASTING STATUS PATIENT QL REPORTED: YES
GLUCOSE SERPL-MCNC: 83 MG/DL (ref 70–99)
HDLC SERPL-MCNC: 60 MG/DL
LDLC SERPL CALC-MCNC: 44 MG/DL
NONHDLC SERPL-MCNC: 60 MG/DL
POTASSIUM SERPL-SCNC: 3.8 MMOL/L (ref 3.4–5.3)
PROT SERPL-MCNC: 7.5 G/DL (ref 6.4–8.3)
SODIUM SERPL-SCNC: 139 MMOL/L (ref 135–145)
TRIGL SERPL-MCNC: 78 MG/DL
TSH SERPL DL<=0.005 MIU/L-ACNC: 1.14 UIU/ML (ref 0.3–4.2)

## 2024-03-19 PROCEDURE — 84443 ASSAY THYROID STIM HORMONE: CPT

## 2024-03-19 PROCEDURE — 80061 LIPID PANEL: CPT

## 2024-03-19 PROCEDURE — 36415 COLL VENOUS BLD VENIPUNCTURE: CPT

## 2024-03-19 PROCEDURE — 80053 COMPREHEN METABOLIC PANEL: CPT

## 2024-03-20 NOTE — RESULT ENCOUNTER NOTE
Judson Cordero,     -Cholesterol levels (LDL,HDL, Triglycerides) are normal.    -Liver and gallbladder tests are normal (ALT,AST, Alk phos, bilirubin), kidney function is normal (Cr, GFR), sodium is normal, potassium is normal, calcium is normal, glucose is normal.  -TSH (thyroid stimulating hormone) level is normal which indicates normal thyroid function.      Thank you,     Kasey Larios, APRN, CNP  [facility]

## 2024-04-19 ASSESSMENT — MIGRAINE DISABILITY ASSESSMENT (MIDAS)
TOTAL SCORE: 10
HOW OFTEN WERE SOCIAL ACTIVITIES MISSED DUE TO HEADACHES: 1
HOW MANY DAYS DID YOU MISS WORK OR SCHOOL BECAUSE OF HEADACHES: 1
HOW MANY DAYS IN THE PAST 3 MONTHS HAVE YOU HAD A HEADACHE: 12
HOW MANY DAYS WAS HOUSEWORK PRODUCTIVITY CUT IN HALF DUE TO HEADACHES: 4
ON A SCALE FROM 0-10 ON AVERAGE HOW PAINFUL WERE HEADACHES: 7
HOW MANY DAYS WAS YOUR PRODUCTIVITY CUT IN HALF BECAUSE OF HEADACHES: 0
HOW MANY DAYS DID YOU NOT DO HOUSEWORK BECAUSE OF HEADACHES: 4

## 2024-04-19 ASSESSMENT — HEADACHE IMPACT TEST (HIT 6)
HOW OFTEN DO HEADACHES LIMIT YOUR DAILY ACTIVITIES: SOMETIMES
WHEN YOU HAVE HEADACHES HOW OFTEN IS THE PAIN SEVERE: VERY OFTEN
WHEN YOU HAVE A HEADACHE HOW OFTEN DO YOU WISH YOU COULD LIE DOWN: VERY OFTEN
HIT6 TOTAL SCORE: 61
HOW OFTEN HAVE YOU FELT FED UP OR IRRITATED BECAUSE OF YOUR HEADACHES: VERY OFTEN
HOW OFTEN HAVE YOU FELT TOO TIRED TO WORK BECAUSE OF YOUR HEADACHES: RARELY
HOW OFTEN DID HEADACHS LIMIT CONCENTRATION ON WORK OR DAILY ACTIVITY: SOMETIMES

## 2024-04-25 ENCOUNTER — TELEPHONE (OUTPATIENT)
Dept: NEUROLOGY | Facility: CLINIC | Age: 39
End: 2024-04-25
Payer: COMMERCIAL

## 2024-04-25 NOTE — TELEPHONE ENCOUNTER
Attempted to reach patient to remind them about appointment scheduled with AMY FELDMAN PA-C on 4/26/24 in our [place] location.  A voicemail was left with a call back number if the patient has questions or would like to reschedule.

## 2024-04-26 ENCOUNTER — OFFICE VISIT (OUTPATIENT)
Dept: NEUROLOGY | Facility: CLINIC | Age: 39
End: 2024-04-26
Attending: NURSE PRACTITIONER
Payer: COMMERCIAL

## 2024-04-26 VITALS
WEIGHT: 149.6 LBS | HEART RATE: 71 BPM | SYSTOLIC BLOOD PRESSURE: 103 MMHG | BODY MASS INDEX: 27.81 KG/M2 | DIASTOLIC BLOOD PRESSURE: 71 MMHG | OXYGEN SATURATION: 100 %

## 2024-04-26 DIAGNOSIS — G43.109 MIGRAINE WITH AURA AND WITHOUT STATUS MIGRAINOSUS, NOT INTRACTABLE: ICD-10-CM

## 2024-04-26 PROCEDURE — 99204 OFFICE O/P NEW MOD 45 MIN: CPT

## 2024-04-26 NOTE — NURSING NOTE
"Consuelo Cyr is a 39 year old female who presents for:  Chief Complaint   Patient presents with    Referral     Migraine with aura and without status migrainosus, not intractable        Initial Vitals:  /71   Pulse 71   Wt 67.9 kg (149 lb 9.6 oz)   LMP 02/17/2024   SpO2 100%   BMI 27.81 kg/m   Estimated body mass index is 27.81 kg/m  as calculated from the following:    Height as of 3/8/24: 1.562 m (5' 1.5\").    Weight as of this encounter: 67.9 kg (149 lb 9.6 oz).. Body surface area is 1.72 meters squared. BP completed using cuff size: ofelia Seth    "

## 2024-04-26 NOTE — LETTER
4/26/2024         RE: Consuelo Cyr  [address]  [place] [address]        Dear Colleague,    Thank you for referring your patient, Consuelo Cyr, to the [facility]. Please see a copy of my visit note below.    [facility]   Headache Neurology Consult    April 26, 2024     Consuelo Cyr MRN# [id number]   YOB: 1985 Age: 39 year old     Referring provider: Kasey Larios          Assessment and Recommendations:     Consuelo Cyr is a 39 year old female who presents for further evaluation of headache.     Her headache presentation meets criteria for frequent episodic migraine with possible visual aura. I suspect she has this on a genetic basis.     Her neurologic examination is overall intact today. I did not recommend any further evaluation for secondary causes of her headaches.     We discussed the following treatment strategy:  - For acute treatment of mild headache, she may use ibuprofen as needed, not to exceed 14 days per month to avoid medication overuse.   - For acute treatment of moderate to severe headache, she may use sumatriptan 50 mg taken at onset of headache with repeat dose after 2 hours if needed. Use should not exceed 9 days per month to avoid medication overuse.    Her current frequency and severity of headaches warrant prevention.  - I recommend she continue with nortriptyline 50 mg nightly. She does note dry mouth with this. Discussed supportive measures such as mouth rinses and staying well hydrated. She wants to continue with this medication.   - She has previously tried and failed propranolol.   - Alternatives to consider include topiramate, verapamil, CGRP inhibitors.     The longitudinal plan of care for the diagnosis(es)/condition(s) as documented were addressed during this visit. Due to the added complexity in care, I will continue to support Consuelo in the subsequent  management and with ongoing continuity of care.     Follow up in 3-4 months to monitor progress.     Antonia Guido PA-C  Headache Neurology  [facility]            Chief Complaint:     Chief Complaint   Patient presents with     Migraine with aura and without status migrainosus, not intr     Referral           History is obtained from the patient and medical record.      Consuelo Cyr is a 39 year old female who presents for further evaluation of headaches.     She first started having headaches in childhood. Over the past several months, her headaches have become more intense and more difficult to manage.     Headaches are usually on the right side. They will start as a warm sensation behind her right ear and they will spread throughout the right side of her face and head. Typically headache is sharp throbbing pain. Headaches area 7-8/10 in severity and last up to 6+ hours.     She can have decreased peripheral vision as headache is increasing in intensity. She has occasional nausea with rare vomiting. She has photophobia, some phonophobia. She can have a feeling of dizziness or vertigo along with nausea.    She currently reports <4/30 severe headache days per month.   She recently increased nortriptyline to 50 mg about one month ago and thinks this has been helpful. She is noting significant dry mouth at the higher dose.   Prior to starting nortriptyline, she was having 12+/30 headache days per month.     To acutely treat headache, she has sumatriptan 50 mg available. She notes this is less effective recently, is needing to take a second dose and pair it with ibuprofen but it will still not completely relieve headache. She prefers to avoid NSAIDs due to GERD.     Previously she has tried propranolol up to 160 mg daily which was not effective. She also used to use rizatriptan but this was discontinued at one point due to insurance coverage. She does not recall how rizatriptan worked  for her.     Headaches tend to come on in the evenings or she will wake up in the morning with a headache. Headache occasionally can wake her from sleep.     She denies focal paresthesias or weakness, cranial autonomic features, positional component, exertional component, fevers or illness.     Headache triggers include corn products, dairy, wheat/gluten.     She has some insomnia at times.     She cut out coffee in November. She will drink black tea during the day.     She denies history of head injuries.     Her mother has a history of migraines.     She has a history of iritis.     Current headache treatments:  Acute therapies:  - Ibuprofen (avoids due to GERD)  - Sumatriptan 50 mg     Preventative therapies:  - Nortriptyline 50 mg     Supportive therapies:  - Heat to neck and shoulders   - Eye mask     Previous treatments tried:  Acute therapies:  - Excedrin - contains corn products   - Rizatriptan - changed due to insurance formulary, does not remember effect    Preventative therapies:  - Propranolol 160 mg - not effective     Supportive therapies:            Past Medical History:     Past Medical History:   Diagnosis Date     Arthritis Early     I have it in my knees and low back     GERD (gastroesophageal reflux disease)      History of cholecystectomy 2016              Past Surgical History:     Past Surgical History:   Procedure Laterality Date     CHOLECYSTECTOMY             Social History:     She works as a . She may need to miss work a couple of times per year due to headaches.     Social History     Socioeconomic History     Marital status:      Spouse name: Not on file     Number of children: Not on file     Years of education: Not on file     Highest education level: Not on file   Occupational History     Not on file   Tobacco Use     Smoking status: Former     Current packs/day: 0.00     Types: Cigarettes     Quit date: 12/3/2002     Years since quittin.4      Smokeless tobacco: Never   Vaping Use     Vaping status: Never Used   Substance and Sexual Activity     Alcohol use: Yes     Comment: Occasionally, might equate to 1-2 per month     Drug use: Never     Sexual activity: Yes     Partners: Male     Birth control/protection: Male Surgical   Other Topics Concern     Parent/sibling w/ CABG, MI or angioplasty before 65F 55M? No   Social History Narrative     Not on file     Social Determinants of Health     Financial Resource Strain: Low Risk  (3/7/2024)    Financial Resource Strain      Within the past 12 months, have you or your family members you live with been unable to get utilities (heat, electricity) when it was really needed?: No   Food Insecurity: Low Risk  (3/7/2024)    Food Insecurity      Within the past 12 months, did you worry that your food would run out before you got money to buy more?: No      Within the past 12 months, did the food you bought just not last and you didn t have money to get more?: No   Transportation Needs: Low Risk  (3/7/2024)    Transportation Needs      Within the past 12 months, has lack of transportation kept you from medical appointments, getting your medicines, non-medical meetings or appointments, work, or from getting things that you need?: No   Physical Activity: Inactive (3/7/2024)    Exercise Vital Sign      Days of Exercise per Week: 0 days      Minutes of Exercise per Session: 0 min   Stress: No Stress Concern Present (3/7/2024)    [employer] - Occupational Stress Questionnaire      Feeling of Stress : Not at all   Social Connections: Unknown (3/7/2024)    Social Connection and Isolation Panel [NHANES]      Frequency of Communication with Friends and Family: Not on file      Frequency of Social Gatherings with Friends and Family: Patient declined      Attends Buddhism Services: Not on file      Active Member of Clubs or Organizations: Not on file      Attends Club or Organization Meetings: Not  on file      Marital Status: Not on file   Interpersonal Safety: Low Risk  (3/8/2024)    Interpersonal Safety      Do you feel physically and emotionally safe where you currently live?: Yes      Within the past 12 months, have you been hit, slapped, kicked or otherwise physically hurt by someone?: No      Within the past 12 months, have you been humiliated or emotionally abused in other ways by your partner or ex-partner?: No   Housing Stability: Low Risk  (3/7/2024)    Housing Stability      Do you have housing? : Yes      Are you worried about losing your housing?: No             Family History:     Family History   Problem Relation Age of Onset     Hypertension Mother      Osteopenia Mother      Arthritis Mother      Cerebrovascular Disease Mother      Anxiety Disorder Mother      Mental Illness Mother      Obesity Mother      Cancer Maternal Grandmother      Colon Cancer Maternal Grandmother      Obesity Maternal Grandmother      Cerebrovascular Disease Maternal Grandfather              Allergies:      Allergies   Allergen Reactions     Ceftin [Cefuroxime] GI Disturbance     Corn-Containing Products GI Disturbance     Dairy Digestive Diarrhea, GI Disturbance and Headache     Doxycycline Other (See Comments)     Racing heart, itchy scalp, tingling     Levaquin [Levofloxacin] GI Disturbance     Nitrofurantoin Other (See Comments)     Tingling, racing heart, itchy scalp     Sulfa Antibiotics Hives     Wheat Gluten [Gluten Meal] GI Disturbance     Zithromax [Azithromycin] Hives             Medications:     Current Outpatient Medications:      nortriptyline (PAMELOR) 50 MG capsule, Take 1 capsule (50 mg) by mouth at bedtime, Disp: 90 capsule, Rfl: 3     SUMAtriptan (IMITREX) 50 MG tablet, Take 1 tablet (50 mg) by mouth at onset of headache for migraine, Disp: 10 tablet, Rfl: 5     esomeprazole (NEXIUM) 40 MG DR capsule, 22 mg, Disp: , Rfl:           Physical Exam:   /71   Pulse 71   Wt 67.9 kg (149 lb 9.6 oz)    LMP 02/17/2024   SpO2 100%   BMI 27.81 kg/m       General: In no acute distress.  Head: Normocephalic, atraumatic. No radiating pain with palpation over the supraorbital notches, occipital nerves. Temporal pulses intact.   Neck: Normal range of motion with lateral head movements and neck flexion.  Eyes: No conjunctival injection, no scleral icterus.     Neurologic Exam:  Mental Status Exam: Alert, awake and oriented to situation. No dysarthria. Speech of normal fluency.  Cranial Nerves: Fundoscopic exam with clear disc margins bilaterally. PERRLA, EOMs intact, no nystagmus, facial movements symmetric, facial sensation intact to light touch, hearing intact to conversation, trapezius and SCMs 5/5 bilaterally, tongue midline and fully mobile. No tongue atrophy or fasciculations.   Motor: Normal tone in all four extremities, no atrophy or fasciculations. 5/5 strength bilaterally in shoulder abduction, elbow flexion and extension, wrist flexion and extension, hip flexion, knee flexion and extension, dorsiflexion and plantarflexion. No tremors or abnormal movements noted.  Sensory: Sensation intact to light touch on arms and legs bilaterally.   Coordination: Finger-nose-finger intact bilaterally. Rapidly alternating movements intact bilaterally in the upper extremities. Normal finger tapping bilaterally. Normal Romberg.  Reflexes: 2+ and symmetric in triceps, biceps, brachioradialis, patellar, and Achilles. Plantar reflexes are downgoing bilaterally.  Gait: Normal gait. Able to toe and heel walk. Normal tandem gait.            Again, thank you for allowing me to participate in the care of your patient.        Sincerely,        AMY FELDMAN PA-C

## 2024-04-26 NOTE — PROGRESS NOTES
[facility]   Headache Neurology Consult    April 26, 2024     Consuelo Cyr MRN# [id number]   YOB: 1985 Age: 39 year old     Referring provider: Kasey Larios          Assessment and Recommendations:     Consuelo Cyr is a 39 year old female who presents for further evaluation of headache.     Her headache presentation meets criteria for frequent episodic migraine with possible visual aura. I suspect she has this on a genetic basis.     Her neurologic examination is overall intact today. I did not recommend any further evaluation for secondary causes of her headaches.     We discussed the following treatment strategy:  - For acute treatment of mild headache, she may use ibuprofen as needed, not to exceed 14 days per month to avoid medication overuse.   - For acute treatment of moderate to severe headache, she may use sumatriptan 50 mg taken at onset of headache with repeat dose after 2 hours if needed. Use should not exceed 9 days per month to avoid medication overuse.    Her current frequency and severity of headaches warrant prevention.  - I recommend she continue with nortriptyline 50 mg nightly. She does note dry mouth with this. Discussed supportive measures such as mouth rinses and staying well hydrated. She wants to continue with this medication.   - She has previously tried and failed propranolol.   - Alternatives to consider include topiramate, verapamil, CGRP inhibitors.     The longitudinal plan of care for the diagnosis(es)/condition(s) as documented were addressed during this visit. Due to the added complexity in care, I will continue to support Consuelo in the subsequent management and with ongoing continuity of care.     Follow up in 3-4 months to monitor progress.     Antonia Guido PA-C  Headache Neurology  [facility]            Chief Complaint:     Chief Complaint   Patient presents with    Migraine with aura and  without status migrainosus, not intr     Referral           History is obtained from the patient and medical record.      Consuelo Cyr is a 39 year old female who presents for further evaluation of headaches.     She first started having headaches in childhood. Over the past several months, her headaches have become more intense and more difficult to manage.     Headaches are usually on the right side. They will start as a warm sensation behind her right ear and they will spread throughout the right side of her face and head. Typically headache is sharp throbbing pain. Headaches area 7-8/10 in severity and last up to 6+ hours.     She can have decreased peripheral vision as headache is increasing in intensity. She has occasional nausea with rare vomiting. She has photophobia, some phonophobia. She can have a feeling of dizziness or vertigo along with nausea.    She currently reports <4/30 severe headache days per month.   She recently increased nortriptyline to 50 mg about one month ago and thinks this has been helpful. She is noting significant dry mouth at the higher dose.   Prior to starting nortriptyline, she was having 12+/30 headache days per month.     To acutely treat headache, she has sumatriptan 50 mg available. She notes this is less effective recently, is needing to take a second dose and pair it with ibuprofen but it will still not completely relieve headache. She prefers to avoid NSAIDs due to GERD.     Previously she has tried propranolol up to 160 mg daily which was not effective. She also used to use rizatriptan but this was discontinued at one point due to insurance coverage. She does not recall how rizatriptan worked for her.     Headaches tend to come on in the evenings or she will wake up in the morning with a headache. Headache occasionally can wake her from sleep.     She denies focal paresthesias or weakness, cranial autonomic features, positional component, exertional component,  fevers or illness.     Headache triggers include corn products, dairy, wheat/gluten.     She has some insomnia at times.     She cut out coffee in November. She will drink black tea during the day.     She denies history of head injuries.     Her mother has a history of migraines.     She has a history of iritis.     Current headache treatments:  Acute therapies:  - Ibuprofen (avoids due to GERD)  - Sumatriptan 50 mg     Preventative therapies:  - Nortriptyline 50 mg     Supportive therapies:  - Heat to neck and shoulders   - Eye mask     Previous treatments tried:  Acute therapies:  - Excedrin - contains corn products   - Rizatriptan - changed due to insurance formulary, does not remember effect    Preventative therapies:  - Propranolol 160 mg - not effective     Supportive therapies:            Past Medical History:     Past Medical History:   Diagnosis Date    Arthritis Early     I have it in my knees and low back    GERD (gastroesophageal reflux disease)     History of cholecystectomy 2016              Past Surgical History:     Past Surgical History:   Procedure Laterality Date    CHOLECYSTECTOMY             Social History:     She works as a . She may need to miss work a couple of times per year due to headaches.     Social History     Socioeconomic History    Marital status:      Spouse name: Not on file    Number of children: Not on file    Years of education: Not on file    Highest education level: Not on file   Occupational History    Not on file   Tobacco Use    Smoking status: Former     Current packs/day: 0.00     Types: Cigarettes     Quit date: 12/3/2002     Years since quittin.4    Smokeless tobacco: Never   Vaping Use    Vaping status: Never Used   Substance and Sexual Activity    Alcohol use: Yes     Comment: Occasionally, might equate to 1-2 per month    Drug use: Never    Sexual activity: Yes     Partners: Male     Birth control/protection: Male Surgical    Other Topics Concern    Parent/sibling w/ CABG, MI or angioplasty before 65F 55M? No   Social History Narrative    Not on file     Social Determinants of Health     Financial Resource Strain: Low Risk  (3/7/2024)    Financial Resource Strain     Within the past 12 months, have you or your family members you live with been unable to get utilities (heat, electricity) when it was really needed?: No   Food Insecurity: Low Risk  (3/7/2024)    Food Insecurity     Within the past 12 months, did you worry that your food would run out before you got money to buy more?: No     Within the past 12 months, did the food you bought just not last and you didn t have money to get more?: No   Transportation Needs: Low Risk  (3/7/2024)    Transportation Needs     Within the past 12 months, has lack of transportation kept you from medical appointments, getting your medicines, non-medical meetings or appointments, work, or from getting things that you need?: No   Physical Activity: Inactive (3/7/2024)    Exercise Vital Sign     Days of Exercise per Week: 0 days     Minutes of Exercise per Session: 0 min   Stress: No Stress Concern Present (3/7/2024)    [employer] [place] [employer] - Occupational Stress Questionnaire     Feeling of Stress : Not at all   Social Connections: Unknown (3/7/2024)    Social Connection and Isolation Panel [NHANES]     Frequency of Communication with Friends and Family: Not on file     Frequency of Social Gatherings with Friends and Family: Patient declined     Attends Restorationist Services: Not on file     Active Member of Clubs or Organizations: Not on file     Attends Club or Organization Meetings: Not on file     Marital Status: Not on file   Interpersonal Safety: Low Risk  (3/8/2024)    Interpersonal Safety     Do you feel physically and emotionally safe where you currently live?: Yes     Within the past 12 months, have you been hit, slapped, kicked or otherwise physically hurt by someone?: No      Within the past 12 months, have you been humiliated or emotionally abused in other ways by your partner or ex-partner?: No   Housing Stability: Low Risk  (3/7/2024)    Housing Stability     Do you have housing? : Yes     Are you worried about losing your housing?: No             Family History:     Family History   Problem Relation Age of Onset    Hypertension Mother     Osteopenia Mother     Arthritis Mother     Cerebrovascular Disease Mother     Anxiety Disorder Mother     Mental Illness Mother     Obesity Mother     Cancer Maternal Grandmother     Colon Cancer Maternal Grandmother     Obesity Maternal Grandmother     Cerebrovascular Disease Maternal Grandfather              Allergies:      Allergies   Allergen Reactions    Ceftin [Cefuroxime] GI Disturbance    Corn-Containing Products GI Disturbance    Dairy Digestive Diarrhea, GI Disturbance and Headache    Doxycycline Other (See Comments)     Racing heart, itchy scalp, tingling    Levaquin [Levofloxacin] GI Disturbance    Nitrofurantoin Other (See Comments)     Tingling, racing heart, itchy scalp    Sulfa Antibiotics Hives    Wheat Gluten [Gluten Meal] GI Disturbance    Zithromax [Azithromycin] Hives             Medications:     Current Outpatient Medications:     nortriptyline (PAMELOR) 50 MG capsule, Take 1 capsule (50 mg) by mouth at bedtime, Disp: 90 capsule, Rfl: 3    SUMAtriptan (IMITREX) 50 MG tablet, Take 1 tablet (50 mg) by mouth at onset of headache for migraine, Disp: 10 tablet, Rfl: 5    esomeprazole (NEXIUM) 40 MG DR capsule, 22 mg, Disp: , Rfl:           Physical Exam:   /71   Pulse 71   Wt 67.9 kg (149 lb 9.6 oz)   LMP 02/17/2024   SpO2 100%   BMI 27.81 kg/m       General: In no acute distress.  Head: Normocephalic, atraumatic. No radiating pain with palpation over the supraorbital notches, occipital nerves. Temporal pulses intact.   Neck: Normal range of motion with lateral head movements and neck flexion.  Eyes: No conjunctival  injection, no scleral icterus.     Neurologic Exam:  Mental Status Exam: Alert, awake and oriented to situation. No dysarthria. Speech of normal fluency.  Cranial Nerves: Fundoscopic exam with clear disc margins bilaterally. PERRLA, EOMs intact, no nystagmus, facial movements symmetric, facial sensation intact to light touch, hearing intact to conversation, trapezius and SCMs 5/5 bilaterally, tongue midline and fully mobile. No tongue atrophy or fasciculations.   Motor: Normal tone in all four extremities, no atrophy or fasciculations. 5/5 strength bilaterally in shoulder abduction, elbow flexion and extension, wrist flexion and extension, hip flexion, knee flexion and extension, dorsiflexion and plantarflexion. No tremors or abnormal movements noted.  Sensory: Sensation intact to light touch on arms and legs bilaterally.   Coordination: Finger-nose-finger intact bilaterally. Rapidly alternating movements intact bilaterally in the upper extremities. Normal finger tapping bilaterally. Normal Romberg.  Reflexes: 2+ and symmetric in triceps, biceps, brachioradialis, patellar, and Achilles. Plantar reflexes are downgoing bilaterally.  Gait: Normal gait. Able to toe and heel walk. Normal tandem gait.

## 2024-04-29 ENCOUNTER — MYC MEDICAL ADVICE (OUTPATIENT)
Dept: NEUROLOGY | Facility: CLINIC | Age: 39
End: 2024-04-29
Payer: COMMERCIAL

## 2024-04-29 DIAGNOSIS — G43.109 MIGRAINE WITH AURA AND WITHOUT STATUS MIGRAINOSUS, NOT INTRACTABLE: ICD-10-CM

## 2024-04-30 RX ORDER — SUMATRIPTAN 100 MG/1
100 TABLET, FILM COATED ORAL
Qty: 18 TABLET | Refills: 3 | Status: SHIPPED | OUTPATIENT
Start: 2024-04-30 | End: 2024-08-26

## 2024-04-30 NOTE — TELEPHONE ENCOUNTER
Per 4/26/24 OV note:  - For acute treatment of moderate to severe headache, she may use sumatriptan 50 mg taken at onset of headache with repeat dose after 2 hours if needed. Use should not exceed 9 days per month to avoid medication overuse.     Amina DUMONT RN, BSN  [facility]

## 2024-05-13 RX ORDER — SUMATRIPTAN 100 MG/1
100 TABLET, FILM COATED ORAL
Qty: 18 TABLET | Refills: 5 | Status: SHIPPED | OUTPATIENT
Start: 2024-05-13 | End: 2024-08-26

## 2024-06-12 ENCOUNTER — TELEPHONE (OUTPATIENT)
Dept: FAMILY MEDICINE | Facility: CLINIC | Age: 39
End: 2024-06-12

## 2024-06-12 NOTE — TELEPHONE ENCOUNTER
General Call      Reason for Call:  On proxy, we are missing the moms signature and the doctors signature    What are your questions or concerns:  signatures.  Mom states dad signed both but MOM is the only one with the chart here.    Date of last appointment with provider: na - the kiddos were here.    Could we send this information to you in [email or web address] or would you prefer to receive a phone call?:         Spoke with pt about signing proxy's for My Chart.  Needing doctors signature on Erik - she gave verbal approval and stated her  signed both forms.    Gave to Dr. Monae to sign for 2nd child ([place])    Carolina CARVER   Abdomen soft, non-tender and non-distended, no rebound, no guarding and no masses. no hepatosplenomegaly.

## 2024-08-21 ENCOUNTER — MYC MEDICAL ADVICE (OUTPATIENT)
Dept: FAMILY MEDICINE | Facility: CLINIC | Age: 39
End: 2024-08-21
Payer: COMMERCIAL

## 2024-08-21 ASSESSMENT — MIGRAINE DISABILITY ASSESSMENT (MIDAS)
TOTAL SCORE: 7
HOW MANY DAYS DID YOU MISS WORK OR SCHOOL BECAUSE OF HEADACHES: 1
ON A SCALE FROM 0-10 ON AVERAGE HOW PAINFUL WERE HEADACHES: 7
HOW MANY DAYS DID YOU NOT DO HOUSEWORK BECAUSE OF HEADACHES: 3
HOW MANY DAYS WAS HOUSEWORK PRODUCTIVITY CUT IN HALF DUE TO HEADACHES: 2
HOW MANY DAYS IN THE PAST 3 MONTHS HAVE YOU HAD A HEADACHE: 8
HOW OFTEN WERE SOCIAL ACTIVITIES MISSED DUE TO HEADACHES: 1
HOW MANY DAYS WAS YOUR PRODUCTIVITY CUT IN HALF BECAUSE OF HEADACHES: 0

## 2024-08-21 ASSESSMENT — HEADACHE IMPACT TEST (HIT 6)
HIT6 TOTAL SCORE: 61
HOW OFTEN DID HEADACHS LIMIT CONCENTRATION ON WORK OR DAILY ACTIVITY: SOMETIMES
HOW OFTEN DO HEADACHES LIMIT YOUR DAILY ACTIVITIES: SOMETIMES
HOW OFTEN HAVE YOU FELT FED UP OR IRRITATED BECAUSE OF YOUR HEADACHES: SOMETIMES
WHEN YOU HAVE HEADACHES HOW OFTEN IS THE PAIN SEVERE: ALWAYS
HOW OFTEN HAVE YOU FELT TOO TIRED TO WORK BECAUSE OF YOUR HEADACHES: RARELY
WHEN YOU HAVE A HEADACHE HOW OFTEN DO YOU WISH YOU COULD LIE DOWN: SOMETIMES

## 2024-08-23 NOTE — TELEPHONE ENCOUNTER
Spoke with Consuelo on the phone and she will schedule a video or telephone visit via [employer].

## 2024-08-26 ENCOUNTER — VIRTUAL VISIT (OUTPATIENT)
Dept: NEUROLOGY | Facility: CLINIC | Age: 39
End: 2024-08-26
Payer: COMMERCIAL

## 2024-08-26 ENCOUNTER — TELEPHONE (OUTPATIENT)
Dept: NEUROLOGY | Facility: CLINIC | Age: 39
End: 2024-08-26

## 2024-08-26 VITALS — BODY MASS INDEX: 28.13 KG/M2 | WEIGHT: 149 LBS | HEIGHT: 61 IN

## 2024-08-26 DIAGNOSIS — G43.109 MIGRAINE WITH AURA AND WITHOUT STATUS MIGRAINOSUS, NOT INTRACTABLE: ICD-10-CM

## 2024-08-26 PROCEDURE — G2211 COMPLEX E/M VISIT ADD ON: HCPCS | Mod: 95

## 2024-08-26 PROCEDURE — 99214 OFFICE O/P EST MOD 30 MIN: CPT | Mod: 95

## 2024-08-26 RX ORDER — SUMATRIPTAN 100 MG/1
100 TABLET, FILM COATED ORAL
Qty: 10 TABLET | Refills: 11 | Status: SHIPPED | OUTPATIENT
Start: 2024-08-26

## 2024-08-26 RX ORDER — NORTRIPTYLINE HYDROCHLORIDE 50 MG/1
50 CAPSULE ORAL AT BEDTIME
Qty: 90 CAPSULE | Refills: 3 | Status: SHIPPED | OUTPATIENT
Start: 2024-08-26

## 2024-08-26 ASSESSMENT — PAIN SCALES - GENERAL: PAINLEVEL: NO PAIN (0)

## 2024-08-26 NOTE — LETTER
8/26/2024      Consuelo Cyr  [address]  [place] [facility] [address]      Dear Colleague,    Thank you for referring your patient, Consuelo Cyr, to the [facility]. Please see a copy of my visit note below.    Virtual Visit Details    Type of service:  Video Visit     Originating Location (pt. Location): Home    Distant Location (provider location):  Off-site  Platform used for Video Visit: [employer]    [facility]    Headache Neurology Progress Note    August 26, 2024    Assessment and Plan:   Consuelo is a 39 year old female who presents for follow up of episodic migraine with possible visual aura.     Her headaches continue to be well managed on her current regimen. No changes were made today.     We reviewed the following treatment strategy:  - For acute treatment of headache, she may use sumatriptan 50 mg taken at onset of headache with a repeat dose taken after 2 hours if needed. Use should not exceed 9 days per month to avoid medication overuse.    Her current frequency and severity of headaches warrant prevention.  - She will continue with nortriptyline 50 mg nightly.   - She has previously tried and failed propranolol.   - Alternatives to consider include topiramate, verapamil, CGRP inhibitors.     Letter for work accommodation sent to patient via [employer].     The longitudinal plan of care for the diagnosis(es)/condition(s) as documented were addressed during this visit. Due to the added complexity in care, I will continue to support Consuelo in the subsequent management and with ongoing continuity of care.     Follow up in 6 months.       Antonia Guido PA-C  Headache Neurology  [facility]      Subjective:    Consuelo presents for follow up of frequent episodic migraine with possible visual aura.     I last saw Consuelo 4/26/2024 (initial headache encounter). At that time, I had recommended she continue  with nortriptyline and sumatriptan though we increased her sumatriptan dose from 25 mg to  mg.     At her baseline, she was experiencing 12+/30 headache days per month.     Consuelo currently reports 2-4/30 headache days per month, with 2-3/30 severe headache days per month.   She gets on headache mid-menstrual cycle and 2-4 headaches around menses.   She can also get a migraine if exposed to corn-derived products due to her allergy. Of note, nortriptyline flags as containing corn products but she has been tolerating this fine.     Nortriptyline 50 mg is still effective for preventative treatment. Manages dry mouth with increased water intake during the day.     She finds sumatriptan 50 mg more effective for acute treatment. Tolerates well. Felt lightheaded at 100 mg dose. Has only needed to take a second dose <3 instances in the past 4 months.     Requests work note for fluorescent overhead lighting being changed out to dimmable or non-fluorescent lighting in her work space.       Current headache treatments:  Acute therapies:  - Ibuprofen (avoids due to GERD)  - Sumatriptan 50 mg - effective      Preventative therapies:  - Nortriptyline 50 mg - effective      Supportive therapies:  - Heat to neck and shoulders   - Eye mask      Previous treatments tried:  Acute therapies:  - Excedrin - contains corn products   - Rizatriptan - changed due to insurance formulary, does not remember effect     Preventative therapies:  - Propranolol 160 mg - not effective      Supportive therapies:        4/19/2024    11:04 AM 8/21/2024    10:17 AM   HIT-6   When you have headaches, how often is the pain severe 11 13   How often do headaches limit your ability to do usual daily activities including household work, work, school, or social activities? 10 10   When you have a headache, how often do you wish you could lie down? 11 10   In the past 4 weeks, how often have you felt too tired to do work or daily activities because of  "your headaches 8 8   In the past 4 weeks, how often have you felt fed up or irritated because of your headaches 11 10   In the past 4 weeks, how often did headaches limit your ability to concentrate on work or daily activities 10 10   HIT-6 Total Score 61 61           4/19/2024    11:31 AM 8/21/2024    10:19 AM   MIDAS - in the past three months:   On how many days did you miss work or school because of your headaches? 1 1   How many days was your productivity at work or school reduced by half or more because of your headaches? 0 0   On how many days did you not do household work because of your headaches? 4 3   How many days was your productivity in household work reduced by half or more because of your headaches? 4 2   On how many days did you miss family, social, or leisure activities because of your headaches? 1 1   On how many days did you have a headache? 12 8   On a scale of 0-10, on average how painful were these headaches? 7 7   MIDAS Score 10 (II - Mild Disability) 7 (II - Mild Disability)        Objective:    Vitals: Ht 1.562 m (5' 1.5\")   Wt 67.6 kg (149 lb)   BMI 27.70 kg/m    General: Cooperative, NAD  Neurologic Exam:  Mental Status: Fully alert, attentive and oriented. Speech is clear and fluent.   Cranial Nerves: Facial movements symmetric.   Motor: No abnormal movements.          Again, thank you for allowing me to participate in the care of your patient.        Sincerely,        AMY FELDMAN PA-C  "

## 2024-08-26 NOTE — TELEPHONE ENCOUNTER
Hello, patient had a virtual appointment today with Antonia Guido. Please assist with getting the patient scheduled for their follow-up appt. Checkout notes below.     Return in about 6 months (around 2/26/2025) for Follow up, with me, using a video visit          Thank you!  Génesis Olsen, Virtual Visit Facilitator

## 2024-08-26 NOTE — LETTER
August 26, 2024      Consuelo Cyr  [address]  [place] [facility] [id number]        To Whom It May Concern:    Consuelo Cyr has been under my care since April 26, 2024. She has a medical condition which causes sensitivity to lights, particularly fluorescent and bright overhead lighting. I recommend the overhead fluorescent lighting at her work station be replaced with dimmable and/or non-fluorescent lighting.        Sincerely,    Antonia Guido PA-C  Headache Neurology  [facility]

## 2024-08-26 NOTE — NURSING NOTE
Current patient location: [facility] [address]  [facility] [address]    Is the patient currently in the state of [place]? YES    Visit mode:VIDEO    If the visit is dropped, the patient can be reconnected by: VIDEO VISIT: Text to cell phone:   Telephone Information:   Mobile [phone]       Will anyone else be joining the visit? NO  (If patient encounters technical issues they should call [phone]    How would you like to obtain your AVS? MyChart    Are changes needed to the allergy or medication list? No    Are refills needed on medications prescribed by this physician? YES    Rooming Documentation:  Questionnaire(s) completed      Reason for visit: Follow Up    Génesis CLEVELAND

## 2024-08-26 NOTE — PROGRESS NOTES
Virtual Visit Details    Type of service:  Video Visit     Originating Location (pt. Location): Home    Distant Location (provider location):  Off-site  Platform used for Video Visit: [facility]    Headache Neurology Progress Note    August 26, 2024    Assessment and Plan:   Consuelo is a 39 year old female who presents for follow up of episodic migraine with possible visual aura.     Her headaches continue to be well managed on her current regimen. No changes were made today.     We reviewed the following treatment strategy:  - For acute treatment of headache, she may use sumatriptan 50 mg taken at onset of headache with a repeat dose taken after 2 hours if needed. Use should not exceed 9 days per month to avoid medication overuse.    Her current frequency and severity of headaches warrant prevention.  - She will continue with nortriptyline 50 mg nightly.   - She has previously tried and failed propranolol.   - Alternatives to consider include topiramate, verapamil, CGRP inhibitors.     Letter for work accommodation sent to patient via [employer].     The longitudinal plan of care for the diagnosis(es)/condition(s) as documented were addressed during this visit. Due to the added complexity in care, I will continue to support Consuelo in the subsequent management and with ongoing continuity of care.     Follow up in 6 months.       Antonia Guido PA-C  Headache Neurology  [facility] [place]      Subjective:    Consuelo presents for follow up of frequent episodic migraine with possible visual aura.     I last saw Consuelo 4/26/2024 (initial headache encounter). At that time, I had recommended she continue with nortriptyline and sumatriptan though we increased her sumatriptan dose from 25 mg to  mg.     At her baseline, she was experiencing 12+/30 headache days per month.     Consuelo currently reports 2-4/30 headache days per month, with 2-3/30 severe headache  days per month.   She gets on headache mid-menstrual cycle and 2-4 headaches around menses.   She can also get a migraine if exposed to corn-derived products due to her allergy. Of note, nortriptyline flags as containing corn products but she has been tolerating this fine.     Nortriptyline 50 mg is still effective for preventative treatment. Manages dry mouth with increased water intake during the day.     She finds sumatriptan 50 mg more effective for acute treatment. Tolerates well. Felt lightheaded at 100 mg dose. Has only needed to take a second dose <3 instances in the past 4 months.     Requests work note for fluorescent overhead lighting being changed out to dimmable or non-fluorescent lighting in her work space.       Current headache treatments:  Acute therapies:  - Ibuprofen (avoids due to GERD)  - Sumatriptan 50 mg - effective      Preventative therapies:  - Nortriptyline 50 mg - effective      Supportive therapies:  - Heat to neck and shoulders   - Eye mask      Previous treatments tried:  Acute therapies:  - Excedrin - contains corn products   - Rizatriptan - changed due to insurance formulary, does not remember effect     Preventative therapies:  - Propranolol 160 mg - not effective      Supportive therapies:        4/19/2024    11:04 AM 8/21/2024    10:17 AM   HIT-6   When you have headaches, how often is the pain severe 11 13   How often do headaches limit your ability to do usual daily activities including household work, work, school, or social activities? 10 10   When you have a headache, how often do you wish you could lie down? 11 10   In the past 4 weeks, how often have you felt too tired to do work or daily activities because of your headaches 8 8   In the past 4 weeks, how often have you felt fed up or irritated because of your headaches 11 10   In the past 4 weeks, how often did headaches limit your ability to concentrate on work or daily activities 10 10   HIT-6 Total Score 61 61            "4/19/2024    11:31 AM 8/21/2024    10:19 AM   MIDAS - in the past three months:   On how many days did you miss work or school because of your headaches? 1 1   How many days was your productivity at work or school reduced by half or more because of your headaches? 0 0   On how many days did you not do household work because of your headaches? 4 3   How many days was your productivity in household work reduced by half or more because of your headaches? 4 2   On how many days did you miss family, social, or leisure activities because of your headaches? 1 1   On how many days did you have a headache? 12 8   On a scale of 0-10, on average how painful were these headaches? 7 7   MIDAS Score 10 (II - Mild Disability) 7 (II - Mild Disability)        Objective:    Vitals: Ht 1.562 m (5' 1.5\")   Wt 67.6 kg (149 lb)   BMI 27.70 kg/m    General: Cooperative, NAD  Neurologic Exam:  Mental Status: Fully alert, attentive and oriented. Speech is clear and fluent.   Cranial Nerves: Facial movements symmetric.   Motor: No abnormal movements.        "

## 2024-09-06 ENCOUNTER — OFFICE VISIT (OUTPATIENT)
Dept: FAMILY MEDICINE | Facility: CLINIC | Age: 39
End: 2024-09-06
Payer: COMMERCIAL

## 2024-09-06 VITALS
DIASTOLIC BLOOD PRESSURE: 66 MMHG | HEIGHT: 62 IN | HEART RATE: 87 BPM | BODY MASS INDEX: 29.13 KG/M2 | WEIGHT: 158.3 LBS | SYSTOLIC BLOOD PRESSURE: 108 MMHG | OXYGEN SATURATION: 100 % | TEMPERATURE: 97.1 F | RESPIRATION RATE: 14 BRPM

## 2024-09-06 DIAGNOSIS — G56.03 BILATERAL CARPAL TUNNEL SYNDROME: ICD-10-CM

## 2024-09-06 DIAGNOSIS — K59.09 CHRONIC CONSTIPATION: Primary | ICD-10-CM

## 2024-09-06 PROCEDURE — 99213 OFFICE O/P EST LOW 20 MIN: CPT | Performed by: NURSE PRACTITIONER

## 2024-09-06 PROCEDURE — G2211 COMPLEX E/M VISIT ADD ON: HCPCS | Performed by: NURSE PRACTITIONER

## 2024-09-06 NOTE — LETTER
September 6, 2024      Consuelo Cyr  [address]  [facility] [address]        To Whom It May Concern,       Consuelo Cyr is an established patient of our clinic.  She has a history of medical conditions requiring work accommodations.  Please facilitate further ergonomic assessment in Consuelo's work environment.        Sincerely,        OXANA Grande CNP

## 2024-09-06 NOTE — LETTER
September 6, 2024      Consuelo Cyr  [address]  [place] [facility] [address]        To Whom It May Concern,       Consuelo Cyr is an established patient of our clinic.  She has a history of medical conditions requiring work accommodations.  Please facilitate further ergonomic assessment in Consuelo's work environment.        Sincerely,        OXANA Grande CNP

## 2024-09-06 NOTE — PROGRESS NOTES
"  Assessment & Plan     Consuelo was seen today for recheck and letter request.    Diagnoses and all orders for this visit:    Chronic constipation  Associated bloating and abdominal pain.  Recommend further consultation with GI due to chronicity of constipation.    -     Adult GI  Referral - Consult Only; Future    Bilateral carpal tunnel syndrome  Chronic, stable.  Work accommodations letter completed.          BMI  Estimated body mass index is 29.43 kg/m  as calculated from the following:    Height as of this encounter: 1.562 m (5' 1.5\").    Weight as of this encounter: 71.8 kg (158 lb 4.8 oz).   The longitudinal plan of care for the diagnosis(es)/condition(s) as documented were addressed during this visit. Due to the added complexity in care, I will continue to support Consuelo in the subsequent management and with ongoing continuity of care.    Eh Cordero is a 39 year old, presenting for the following health issues:  RECHECK (Constipation, abdominal pain, gas) and Letter Request (Work accommodation for circulation in legs and carpel tunnel)        9/6/2024    10:31 AM   Additional Questions   Roomed by Lesli     History of Present Illness       Reason for visit:  Abdominal pain with constipation and trapped gas    She eats 2-3 servings of fruits and vegetables daily.She consumes 0 sweetened beverage(s) daily.She exercises with enough effort to increase her heart rate 20 to 29 minutes per day.  She exercises with enough effort to increase her heart rate 3 or less days per week.   She is taking medications regularly.     Recheck Constipation, abdominal pain (mostly on right side) and gas x multiple years.  It all starts with constipation and then bloating, gas come after that.    She says it is worse during her period.  She has a family history of stomach cancer.    Started to pay more attention to this.  Bloating is worse closer to cycle.  Is going 2-3 days in between bowel movements.  Will " "feel painful gas associated with constipation.    Will take an herbal laxative if no bowel movement and this is helpful.      Letter request for accommodations for work due to circulation issues in legs and carpal tunnel. Has desk at 26 inches, sit stand desk.    Carpal Tunnel and a circulation issue in her legs. Symptoms had been well managed with ergonomic seating and other accommodations. Previously had a special chair and ergo key board.  Works from home and in the office.  Had to move offices and didn't get to keep supplies.  My employer made these accommodations more than 10 years ago and are now requiring me to get a doctor's recommendation for these accommodations.   Previously participated in physical therapy. EMG at age 19/20. Is trying to avoid surgery.        Review of Systems  Constitutional, HEENT, cardiovascular, pulmonary, gi and gu systems are negative, except as otherwise noted.      Objective    /66   Pulse 87   Temp 97.1  F (36.2  C) (Tympanic)   Resp 14   Ht 1.562 m (5' 1.5\")   Wt 71.8 kg (158 lb 4.8 oz)   LMP 08/16/2024 (Exact Date)   SpO2 100%   BMI 29.43 kg/m    Body mass index is 29.43 kg/m .  Physical Exam   GENERAL: alert and no distress  RESP: lungs clear to auscultation - no rales, rhonchi or wheezes  CV: regular rate and rhythm, normal S1 S2  ABDOMEN: soft, nontender, bowel sounds normal  PSYCH: mentation appears normal, affect normal/bright            Signed Electronically by: OAXNA Grande CNP    "

## 2024-09-10 ENCOUNTER — TELEPHONE (OUTPATIENT)
Dept: GASTROENTEROLOGY | Facility: CLINIC | Age: 39
End: 2024-09-10

## 2024-09-10 NOTE — TELEPHONE ENCOUNTER
[facility] Call Center    Phone Message    May a detailed message be left on voicemail: Yes    Reason for Call: Other: Patient is currently scheduled on 11/21, as visit type New GI Urgent. This is outside the expected timeline for this referral. Patient has been added to the waitlist.      Action Taken: Message routed to:  Other: GI REFERRAL TRIAGE POOL     Travel Screening: Not Applicable

## 2024-09-18 NOTE — TELEPHONE ENCOUNTER
Clinic Navigator sent a [employer] message to inform the Pt that Pt is on the wait list for a sooner appointment. Clinic Navigator will reach out to the Pt via phone call or [employer] when a sooner appointment becomes available.

## 2024-09-24 ENCOUNTER — MYC MEDICAL ADVICE (OUTPATIENT)
Dept: FAMILY MEDICINE | Facility: CLINIC | Age: 39
End: 2024-09-24
Payer: COMMERCIAL

## 2024-09-25 ENCOUNTER — TELEPHONE (OUTPATIENT)
Dept: FAMILY MEDICINE | Facility: CLINIC | Age: 39
End: 2024-09-25
Payer: COMMERCIAL

## 2024-09-25 NOTE — TELEPHONE ENCOUNTER
Forms/Letter Request    Type of form/letter: [employer] - [place] forms to fill out    Do we have the form/letter: Yes:     Who is the form from? Patient    Where did/will the form come from? form was sent via [employer]    When is form/letter needed by: asap    How would you like the form/letter returned: Fax : [phone]    Patient Notified form requests are processed in 5-7 business days:No    Could we send this information to you in [employer] or would you prefer to receive a phone call?:   na    Put in providers in box    Carolina K

## 2024-10-03 ENCOUNTER — VIRTUAL VISIT (OUTPATIENT)
Dept: FAMILY MEDICINE | Facility: CLINIC | Age: 39
End: 2024-10-03
Payer: COMMERCIAL

## 2024-10-03 DIAGNOSIS — Z02.89 ENCOUNTER FOR COMPLETION OF FORM WITH PATIENT: Primary | ICD-10-CM

## 2024-10-03 PROCEDURE — 99441 PR PHYSICIAN TELEPHONE EVALUATION 5-10 MIN: CPT | Performed by: NURSE PRACTITIONER

## 2024-10-03 ASSESSMENT — PATIENT HEALTH QUESTIONNAIRE - PHQ9
SUM OF ALL RESPONSES TO PHQ QUESTIONS 1-9: 3
10. IF YOU CHECKED OFF ANY PROBLEMS, HOW DIFFICULT HAVE THESE PROBLEMS MADE IT FOR YOU TO DO YOUR WORK, TAKE CARE OF THINGS AT HOME, OR GET ALONG WITH OTHER PEOPLE: VERY DIFFICULT
SUM OF ALL RESPONSES TO PHQ QUESTIONS 1-9: 3

## 2024-10-03 ASSESSMENT — ANXIETY QUESTIONNAIRES
1. FEELING NERVOUS, ANXIOUS, OR ON EDGE: NOT AT ALL
7. FEELING AFRAID AS IF SOMETHING AWFUL MIGHT HAPPEN: NOT AT ALL
7. FEELING AFRAID AS IF SOMETHING AWFUL MIGHT HAPPEN: NOT AT ALL
3. WORRYING TOO MUCH ABOUT DIFFERENT THINGS: NOT AT ALL
GAD7 TOTAL SCORE: 0
2. NOT BEING ABLE TO STOP OR CONTROL WORRYING: NOT AT ALL
4. TROUBLE RELAXING: NOT AT ALL
GAD7 TOTAL SCORE: 0
6. BECOMING EASILY ANNOYED OR IRRITABLE: NOT AT ALL
GAD7 TOTAL SCORE: 0
IF YOU CHECKED OFF ANY PROBLEMS ON THIS QUESTIONNAIRE, HOW DIFFICULT HAVE THESE PROBLEMS MADE IT FOR YOU TO DO YOUR WORK, TAKE CARE OF THINGS AT HOME, OR GET ALONG WITH OTHER PEOPLE: NOT DIFFICULT AT ALL
8. IF YOU CHECKED OFF ANY PROBLEMS, HOW DIFFICULT HAVE THESE MADE IT FOR YOU TO DO YOUR WORK, TAKE CARE OF THINGS AT HOME, OR GET ALONG WITH OTHER PEOPLE?: NOT DIFFICULT AT ALL
5. BEING SO RESTLESS THAT IT IS HARD TO SIT STILL: NOT AT ALL

## 2024-10-03 NOTE — PROGRESS NOTES
"Consuelo is a 39 year old who is being evaluated via a billable video visit.    How would you like to obtain your AVS? MyChart  If the video visit is dropped, the invitation should be resent by: Text to cell phone: [phone]  Will anyone else be joining your video visit? No    Assessment & Plan     Consuelo was seen today for follow up.    Diagnoses and all orders for this visit:    Encounter for completion of form with patient  Reviewed and completed accommodation form with patient.        BMI  Estimated body mass index is 29.43 kg/m  as calculated from the following:    Height as of 9/6/24: 1.562 m (5' 1.5\").    Weight as of 9/6/24: 71.8 kg (158 lb 4.8 oz).         Subjective   Consuelo is a 39 year old, presenting for the following health issues:  Follow Up (To complete forms for employer )      History of Present Illness       Reason for visit:  Follow up    She eats 2-3 servings of fruits and vegetables daily.She consumes 2 sweetened beverage(s) daily.She exercises with enough effort to increase her heart rate 9 or less minutes per day.  She exercises with enough effort to increase her heart rate 3 or less days per week.   She is taking medications regularly.           Review of Systems  Constitutional, HEENT, cardiovascular, pulmonary, gi and gu systems are negative, except as otherwise noted.      Objective           Vitals:  No vitals were obtained today due to virtual visit.    Physical Exam     GENERAL: alert and no distress  PSYCH: Appropriate affect, tone, and pace of words          Visit Details    Type of service:  Telephone Visit, 5 minutes  Originating Location (pt. Location): Home  Distant Location (provider location):  On-site          Signed Electronically by: OXANA Grande CNP      "

## 2024-10-03 NOTE — TELEPHONE ENCOUNTER
Attempt #2    Cld and LM to call back and make appt for VV  - please assist and see encounter from provider    Carolina CARVRE

## 2024-10-04 NOTE — TELEPHONE ENCOUNTER
Attempt #1    LM w/pt to call back and let us know if pt wants to come in and  forms or have them mailed?   There will be a copy up front as of today - no fax to send.    Please advise.    Carolina CARVER

## 2024-10-07 ENCOUNTER — MYC MEDICAL ADVICE (OUTPATIENT)
Dept: FAMILY MEDICINE | Facility: CLINIC | Age: 39
End: 2024-10-07
Payer: COMMERCIAL

## 2024-10-22 ENCOUNTER — HOSPITAL ENCOUNTER (OUTPATIENT)
Dept: ULTRASOUND IMAGING | Facility: CLINIC | Age: 39
Discharge: HOME | End: 2024-10-22
Attending: NURSE PRACTITIONER | Admitting: NURSE PRACTITIONER
Payer: COMMERCIAL

## 2024-10-22 DIAGNOSIS — R10.2 PELVIC PAIN IN FEMALE: ICD-10-CM

## 2024-10-22 PROCEDURE — 76856 US EXAM PELVIC COMPLETE: CPT

## 2024-10-23 ENCOUNTER — MYC MEDICAL ADVICE (OUTPATIENT)
Dept: FAMILY MEDICINE | Facility: CLINIC | Age: 39
End: 2024-10-23
Payer: COMMERCIAL

## 2024-10-23 NOTE — RESULT ENCOUNTER NOTE
Dear Consuelo,     I received your pelvic ultrasound results and there was a noted intramural fibroid (fibroid along the wall of the uterus).  If you are having persistent pelvic pain around your menstrual cycle, the next steps would be to complete further consultation with OBGYN.  If you need a referral for this, please let me know and I would happy to place this.      Thank you,     Kasey Larios, APRN, CNP  [facility]

## 2024-11-05 ENCOUNTER — MYC MEDICAL ADVICE (OUTPATIENT)
Dept: FAMILY MEDICINE | Facility: CLINIC | Age: 39
End: 2024-11-05
Payer: COMMERCIAL

## 2025-02-15 ENCOUNTER — HEALTH MAINTENANCE LETTER (OUTPATIENT)
Age: 40
End: 2025-02-15

## 2025-02-19 ASSESSMENT — MIGRAINE DISABILITY ASSESSMENT (MIDAS)
HOW MANY DAYS WAS YOUR PRODUCTIVITY CUT IN HALF BECAUSE OF HEADACHES: 6
HOW MANY DAYS DID YOU MISS WORK OR SCHOOL BECAUSE OF HEADACHES: 2
HOW MANY DAYS WAS HOUSEWORK PRODUCTIVITY CUT IN HALF DUE TO HEADACHES: 6
HOW MANY DAYS IN THE PAST 3 MONTHS HAVE YOU HAD A HEADACHE: 10
ON A SCALE FROM 0-10 ON AVERAGE HOW PAINFUL WERE HEADACHES: 7
TOTAL SCORE: 14
HOW OFTEN WERE SOCIAL ACTIVITIES MISSED DUE TO HEADACHES: 0
HOW MANY DAYS DID YOU NOT DO HOUSEWORK BECAUSE OF HEADACHES: 0

## 2025-02-19 ASSESSMENT — HEADACHE IMPACT TEST (HIT 6)
HOW OFTEN DID HEADACHS LIMIT CONCENTRATION ON WORK OR DAILY ACTIVITY: SOMETIMES
HOW OFTEN HAVE YOU FELT TOO TIRED TO WORK BECAUSE OF YOUR HEADACHES: RARELY
WHEN YOU HAVE A HEADACHE HOW OFTEN DO YOU WISH YOU COULD LIE DOWN: SOMETIMES
WHEN YOU HAVE HEADACHES HOW OFTEN IS THE PAIN SEVERE: VERY OFTEN
HIT6 TOTAL SCORE: 62
HOW OFTEN HAVE YOU FELT FED UP OR IRRITATED BECAUSE OF YOUR HEADACHES: ALWAYS
HOW OFTEN DO HEADACHES LIMIT YOUR DAILY ACTIVITIES: SOMETIMES

## 2025-02-19 NOTE — TELEPHONE ENCOUNTER
Filled per [facility] protocol. Pharmacy change    CLAUDIA LairdN, RN  [employer] [facility]     oral

## 2025-02-20 NOTE — ADDENDUM NOTE
Addended by: ARISTIDES NICKERSON on: 12/5/2019 02:04 PM     Modules accepted: Orders     Fasting blood work  Glucometer and supplies  Check blood sugar with episodes of weakness write down and call the office

## 2025-02-24 ENCOUNTER — VIRTUAL VISIT (OUTPATIENT)
Dept: NEUROLOGY | Facility: CLINIC | Age: 40
End: 2025-02-24
Payer: COMMERCIAL

## 2025-02-24 DIAGNOSIS — G43.109 MIGRAINE WITH AURA AND WITHOUT STATUS MIGRAINOSUS, NOT INTRACTABLE: Primary | ICD-10-CM

## 2025-02-24 PROCEDURE — G2211 COMPLEX E/M VISIT ADD ON: HCPCS | Mod: 95

## 2025-02-24 PROCEDURE — 98002 SYNCH AUDIO-VIDEO NEW MOD 45: CPT

## 2025-02-24 NOTE — NURSING NOTE
Current patient location: [address] [place] [address] [address]  [place] [facility] [address]    Is the patient currently in the state of [place]? YES    Visit mode: VIDEO    If the visit is dropped, the patient can be reconnected by:TELEPHONE VISIT: Phone number:   Telephone Information:   Mobile [phone]       Will anyone else be joining the visit? NO  (If patient encounters technical issues they should call [phone]    Are changes needed to the allergy or medication list? Pt stated no med changes    Are refills needed on medications prescribed by this physician? NO    Rooming Documentation:  Questionnaire(s) completed    Reason for visit: Headache    Leanne MURILLOF

## 2025-02-24 NOTE — PROGRESS NOTES
Virtual Visit Details    Type of service:  Video Visit     Originating Location (pt. Location): Home    Distant Location (provider location):  Off-site  Platform used for Video Visit: [facility]    Headache Neurology Progress Note    February 24, 2025    Assessment and Plan:   Consuelo is a 40 year old female who presents for follow up of episodic migraine with aura.     She will continue with the following headache treatment strategy:  - For acute treatment of headache, she may use sumatriptan 50 mg taken at onset of headache with a repeat dose taken after 2 hours if needed. Use should not exceed 9 days per month to avoid medication overuse.  - If alternative to sumatriptan is needed, consider zolmitriptan or naratriptan.     Her current frequency and severity of headaches warrant prevention.  - She will continue with nortriptyline 50 mg nightly.   - She has previously tried and failed propranolol.   - Alternatives to consider include topiramate, CGRP inhibitors.     The longitudinal plan of care for the diagnosis(es)/condition(s) as documented were addressed during this visit. Due to the added complexity in care, I will continue to support Consuelo in the subsequent management and with ongoing continuity of care.     Follow up in 6 months or sooner if needed.     Antonia Guido PA-C  Headache Neurology  [facility] - [place]      Subjective:    Consuelo presents for follow up of for episodic migraine with aura.     At her baseline, she was experiencing 12+/30 headache days per month.     Consuelo currently reports 3-6/30 headache days per month. Gets a migraine once every other week, usually with menstrual cycle and due to dietary triggers.    Migraines may last 1-2 days in duration. Sumatriptan 50 mg still helpful.    Continues with nortriptyline 50 mg. Has some dry mouth which is manageable.     Upcoming hysterectomy. Wants to see how migraines are following  "this before making any changes to headache plan.     Denies any new headache features. Notes that she gets a \"vibration\" sense hearing certain pitches of sounds before migraine starts. Can sometimes have darkened peripheral vision with migraine.     Has work accommodations letter to change lighting at workstation. Will need updated at next visit.     Current headache treatments:  Acute therapies:  - Ibuprofen (avoids due to GERD)  - Sumatriptan 50 mg - effective      Preventative therapies:  - Nortriptyline 50 mg - effective      Supportive therapies:  - Heat to neck and shoulders   - Eye mask      Previous treatments tried:  Acute therapies:  - Excedrin - contains corn products   - Rizatriptan - changed due to insurance formulary, does not remember effect     Preventative therapies:  - Propranolol 160 mg - not effective      Supportive therapies:        4/19/2024    11:04 AM 8/21/2024    10:17 AM 2/19/2025     9:19 AM   HIT-6   When you have headaches, how often is the pain severe 11 13 11   How often do headaches limit your ability to do usual daily activities including household work, work, school, or social activities? 10 10 10   When you have a headache, how often do you wish you could lie down? 11 10 10   In the past 4 weeks, how often have you felt too tired to do work or daily activities because of your headaches 8 8 8   In the past 4 weeks, how often have you felt fed up or irritated because of your headaches 11 10 13   In the past 4 weeks, how often did headaches limit your ability to concentrate on work or daily activities 10 10 10   HIT-6 Total Score 61 61 62        Patient-reported           4/19/2024    11:31 AM 8/21/2024    10:19 AM 2/19/2025     9:21 AM   MIDAS - in the past three months:   On how many days did you miss work or school because of your headaches? 1 1 2   How many days was your productivity at work or school reduced by half or more because of your headaches? 0 0 6   On how many days did " you not do household work because of your headaches? 4 3 0   How many days was your productivity in household work reduced by half or more because of your headaches? 4 2 6   On how many days did you miss family, social, or leisure activities because of your headaches? 1 1 0   On how many days did you have a headache? 12 8 10   On a scale of 0-10, on average how painful were these headaches? 7 7 7   MIDAS Score 10 (II - Mild Disability) 7 (II - Mild Disability) 14 (III - Moderate Disability)       Objective:    Vitals: There were no vitals taken for this visit.  General: Cooperative, NAD  Neurologic Exam:  Mental Status: Fully alert, attentive and oriented. Speech is clear and fluent.   Cranial Nerves: Facial movements symmetric.   Motor: No abnormal movements.

## 2025-02-24 NOTE — LETTER
2/24/2025      Consuelo Cyr  [address]  [facility] [address]      Dear Colleague,    Thank you for referring your patient, Consuelo Cyr, to the [facility]. Please see a copy of my visit note below.    Virtual Visit Details    Type of service:  Video Visit     Originating Location (pt. Location): Home    Distant Location (provider location):  Off-site  Platform used for Video Visit: [employer]    [facility]    Headache Neurology Progress Note    February 24, 2025    Assessment and Plan:   Consuelo is a 40 year old female who presents for follow up of episodic migraine with aura.     She will continue with the following headache treatment strategy:  - For acute treatment of headache, she may use sumatriptan 50 mg taken at onset of headache with a repeat dose taken after 2 hours if needed. Use should not exceed 9 days per month to avoid medication overuse.  - If alternative to sumatriptan is needed, consider zolmitriptan or naratriptan.     Her current frequency and severity of headaches warrant prevention.  - She will continue with nortriptyline 50 mg nightly.   - She has previously tried and failed propranolol.   - Alternatives to consider include topiramate, CGRP inhibitors.     The longitudinal plan of care for the diagnosis(es)/condition(s) as documented were addressed during this visit. Due to the added complexity in care, I will continue to support Consuelo in the subsequent management and with ongoing continuity of care.     Follow up in 6 months or sooner if needed.     Antonia Guido PA-C  Headache Neurology  [facility]      Subjective:    Consuelo presents for follow up of for episodic migraine with aura.     At her baseline, she was experiencing 12+/30 headache days per month.     Consuelo currently reports 3-6/30 headache days per month. Gets a migraine once every other week, usually with menstrual  "cycle and due to dietary triggers.    Migraines may last 1-2 days in duration. Sumatriptan 50 mg still helpful.    Continues with nortriptyline 50 mg. Has some dry mouth which is manageable.     Upcoming hysterectomy. Wants to see how migraines are following this before making any changes to headache plan.     Denies any new headache features. Notes that she gets a \"vibration\" sense hearing certain pitches of sounds before migraine starts. Can sometimes have darkened peripheral vision with migraine.     Has work accommodations letter to change lighting at workstation. Will need updated at next visit.     Current headache treatments:  Acute therapies:  - Ibuprofen (avoids due to GERD)  - Sumatriptan 50 mg - effective      Preventative therapies:  - Nortriptyline 50 mg - effective      Supportive therapies:  - Heat to neck and shoulders   - Eye mask      Previous treatments tried:  Acute therapies:  - Excedrin - contains corn products   - Rizatriptan - changed due to insurance formulary, does not remember effect     Preventative therapies:  - Propranolol 160 mg - not effective      Supportive therapies:        4/19/2024    11:04 AM 8/21/2024    10:17 AM 2/19/2025     9:19 AM   HIT-6   When you have headaches, how often is the pain severe 11 13 11   How often do headaches limit your ability to do usual daily activities including household work, work, school, or social activities? 10 10 10   When you have a headache, how often do you wish you could lie down? 11 10 10   In the past 4 weeks, how often have you felt too tired to do work or daily activities because of your headaches 8 8 8   In the past 4 weeks, how often have you felt fed up or irritated because of your headaches 11 10 13   In the past 4 weeks, how often did headaches limit your ability to concentrate on work or daily activities 10 10 10   HIT-6 Total Score 61 61 62        Patient-reported           4/19/2024    11:31 AM 8/21/2024    10:19 AM 2/19/2025     " 9:21 AM   MIDAS - in the past three months:   On how many days did you miss work or school because of your headaches? 1 1 2   How many days was your productivity at work or school reduced by half or more because of your headaches? 0 0 6   On how many days did you not do household work because of your headaches? 4 3 0   How many days was your productivity in household work reduced by half or more because of your headaches? 4 2 6   On how many days did you miss family, social, or leisure activities because of your headaches? 1 1 0   On how many days did you have a headache? 12 8 10   On a scale of 0-10, on average how painful were these headaches? 7 7 7   MIDAS Score 10 (II - Mild Disability) 7 (II - Mild Disability) 14 (III - Moderate Disability)       Objective:    Vitals: There were no vitals taken for this visit.  General: Cooperative, NAD  Neurologic Exam:  Mental Status: Fully alert, attentive and oriented. Speech is clear and fluent.   Cranial Nerves: Facial movements symmetric.   Motor: No abnormal movements.            Again, thank you for allowing me to participate in the care of your patient.        Sincerely,        Antonia Guido PA-C    Electronically signed

## 2025-02-25 ENCOUNTER — HOSPITAL ENCOUNTER (OUTPATIENT)
Dept: MAMMOGRAPHY | Facility: CLINIC | Age: 40
Discharge: HOME | End: 2025-02-25
Attending: NURSE PRACTITIONER
Payer: COMMERCIAL

## 2025-02-25 DIAGNOSIS — Z12.31 VISIT FOR SCREENING MAMMOGRAM: ICD-10-CM

## 2025-02-25 PROCEDURE — 77063 BREAST TOMOSYNTHESIS BI: CPT

## 2025-03-08 SDOH — HEALTH STABILITY: PHYSICAL HEALTH: ON AVERAGE, HOW MANY MINUTES DO YOU ENGAGE IN EXERCISE AT THIS LEVEL?: 0 MIN

## 2025-03-08 SDOH — HEALTH STABILITY: PHYSICAL HEALTH: ON AVERAGE, HOW MANY DAYS PER WEEK DO YOU ENGAGE IN MODERATE TO STRENUOUS EXERCISE (LIKE A BRISK WALK)?: 0 DAYS

## 2025-03-08 ASSESSMENT — SOCIAL DETERMINANTS OF HEALTH (SDOH): HOW OFTEN DO YOU GET TOGETHER WITH FRIENDS OR RELATIVES?: ONCE A WEEK

## 2025-03-13 ENCOUNTER — OFFICE VISIT (OUTPATIENT)
Dept: FAMILY MEDICINE | Facility: CLINIC | Age: 40
End: 2025-03-13
Attending: NURSE PRACTITIONER
Payer: COMMERCIAL

## 2025-03-13 VITALS
HEIGHT: 62 IN | WEIGHT: 163 LBS | BODY MASS INDEX: 30 KG/M2 | HEART RATE: 88 BPM | DIASTOLIC BLOOD PRESSURE: 62 MMHG | SYSTOLIC BLOOD PRESSURE: 104 MMHG | TEMPERATURE: 98.1 F | OXYGEN SATURATION: 99 %

## 2025-03-13 DIAGNOSIS — Z13.29 SCREENING FOR THYROID DISORDER: ICD-10-CM

## 2025-03-13 DIAGNOSIS — Z00.00 ROUTINE GENERAL MEDICAL EXAMINATION AT A HEALTH CARE FACILITY: Primary | ICD-10-CM

## 2025-03-13 DIAGNOSIS — Z13.0 SCREENING FOR DEFICIENCY ANEMIA: ICD-10-CM

## 2025-03-13 DIAGNOSIS — Z13.220 SCREENING FOR LIPID DISORDERS: ICD-10-CM

## 2025-03-13 DIAGNOSIS — Z13.1 SCREENING FOR DIABETES MELLITUS: ICD-10-CM

## 2025-03-13 LAB
ERYTHROCYTE [DISTWIDTH] IN BLOOD BY AUTOMATED COUNT: 12.4 % (ref 10–15)
HCT VFR BLD AUTO: 40 % (ref 35–47)
HGB BLD-MCNC: 13.5 G/DL (ref 11.7–15.7)
MCH RBC QN AUTO: 29.8 PG (ref 26.5–33)
MCHC RBC AUTO-ENTMCNC: 33.8 G/DL (ref 31.5–36.5)
MCV RBC AUTO: 88 FL (ref 78–100)
PLATELET # BLD AUTO: 234 10E3/UL (ref 150–450)
RBC # BLD AUTO: 4.53 10E6/UL (ref 3.8–5.2)
WBC # BLD AUTO: 4.2 10E3/UL (ref 4–11)

## 2025-03-13 NOTE — PROGRESS NOTES
"Preventive Care Visit  [facility]  OXANA Grande CNP, Family Medicine  Mar 13, 2025      Assessment & Plan     Consuelo was seen today for physical.    Diagnoses and all orders for this visit:    Routine general medical examination at a health care facility  -     PRIMARY CARE FOLLOW-UP SCHEDULING  -     PRIMARY CARE FOLLOW-UP SCHEDULING; Future    Screening for diabetes mellitus  -     Comprehensive metabolic panel (BMP + Alb, Alk Phos, ALT, AST, Total. Bili, TP)    Screening for lipid disorders  -     Lipid panel reflex to direct LDL Fasting    Screening for deficiency anemia  -     CBC with platelets    Screening for thyroid disorder  -     TSH with free T4 reflex          BMI  Estimated body mass index is 30.3 kg/m  as calculated from the following:    Height as of this encounter: 1.562 m (5' 1.5\").    Weight as of this encounter: 73.9 kg (163 lb).       Counseling  Appropriate preventive services were addressed with this patient.  Checklist reviewing preventive services available has been given to the patient.    Return in about 1 year (around 3/13/2026) for Preventative Visit .        Subjective   Consuelo is a 40 year old, presenting for the following:    Physical        3/13/2025     9:17 AM   Additional Questions   Roomed by Justine COLE    No concerns.     Planning hysterectomy on 3/18/25 with Dr. Shelbie Adkins - [facility]      Advance Care Planning  Patient does not have a Health Care Directive.          3/8/2025   General Health   How would you rate your overall physical health? (!) FAIR   Feel stress (tense, anxious, or unable to sleep) Very much   (!) STRESS CONCERN      3/8/2025   Nutrition   Three or more servings of calcium each day? (!) NO   Diet: Gluten-free/reduced    Other   If other, please elaborate: No corn or dairy. Low grain consumption.   How many servings of fruit and vegetables per day? 4 or more   How many sweetened " beverages each day? 0-1       Multiple values from one day are sorted in reverse-chronological order         3/8/2025   Exercise   Days per week of moderate/strenous exercise 0 days   Average minutes spent exercising at this level 0 min   (!) EXERCISE CONCERN      3/8/2025   Social Factors   Frequency of gathering with friends or relatives Once a week   Worry food won't last until get money to buy more No   Food not last or not have enough money for food? No   Do you have housing? (Housing is defined as stable permanent housing and does not include staying ouside in a car, in a tent, in an abandoned building, in an overnight shelter, or couch-surfing.) Yes   Are you worried about losing your housing? No   Lack of transportation? No   Unable to get utilities (heat,electricity)? No         3/8/2025   Dental   Dentist two times every year? Yes           3/7/2024   TB Screening   Were you born outside of the [place]? No           Today's PHQ-2 Score:       3/13/2025     9:17 AM   PHQ-2 (  [employer])   Q1: Little interest or pleasure in doing things 1   Q2: Feeling down, depressed or hopeless 1   PHQ-2 Score 2    Q1: Little interest or pleasure in doing things Several days   Q2: Feeling down, depressed or hopeless Several days   PHQ-2 Score 2       Patient-reported           3/8/2025   Substance Use   Alcohol more than 3/day or more than 7/wk No   Do you use any other substances recreationally? (!) CANNABIS PRODUCTS     Social History     Tobacco Use    Smoking status: Former     Current packs/day: 0.00     Types: Cigarettes     Quit date: 12/3/2002     Years since quittin.2    Smokeless tobacco: Never   Vaping Use    Vaping status: Never Used   Substance Use Topics    Alcohol use: Yes     Comment: Occasionally, might equate to 1-2 per month    Drug use: Never           2025   LAST FHS-7 RESULTS   1st degree relative breast or ovarian cancer No   Any relative bilateral breast cancer No   Any male have breast cancer  No   Any ONE woman have BOTH breast AND ovarian cancer No   Any woman with breast cancer before 50yrs No   2 or more relatives with breast AND/OR ovarian cancer No   2 or more relatives with breast AND/OR bowel cancer No       Mammogram Screening - Mammogram every 1-2 years updated in Health Maintenance based on mutual decision making        3/8/2025   STI Screening   New sexual partner(s) since last STI/HIV test? No     History of abnormal Pap smear: No - age 30-64 HPV with reflex Pap every 5 years recommended        Latest Ref Rng & Units 2020    11:46 AM 2020    11:45 AM 2017    12:00 AM   PAP / HPV   PAP (Historical)  NIL      HPV 16 DNA NEG^Negative  Negative     HPV 18 DNA NEG^Negative  Negative     Other HR HPV NEG^Negative  Negative     PAP-ABSTRACT    See Scanned Document           This result is from an external source.     ASCVD Risk   The ASCVD Risk score (Nakita MODI, et al., 2019) failed to calculate for the following reasons:    The valid total cholesterol range is 130 to 320 mg/dL        3/8/2025   Contraception/Family Planning   Questions about contraception or family planning No        Reviewed and updated as needed this visit by Provider     Meds  Problems               BP Readings from Last 3 Encounters:   25 104/62   24 108/66   24 103/71    Wt Readings from Last 3 Encounters:   25 73.9 kg (163 lb)   24 71.8 kg (158 lb 4.8 oz)   24 67.6 kg (149 lb)                  Patient Active Problem List   Diagnosis    Migraine without aura and without status migrainosus, not intractable    Dysmenorrhea    Gastroesophageal reflux disease, esophagitis presence not specified    Heart palpitations     Past Surgical History:   Procedure Laterality Date    CHOLECYSTECTOMY         Social History     Tobacco Use    Smoking status: Former     Current packs/day: 0.00     Types: Cigarettes     Quit date: 12/3/2002     Years since quittin.2     "Smokeless tobacco: Never   Substance Use Topics    Alcohol use: Yes     Comment: Occasionally, might equate to 1-2 per month     Family History   Problem Relation Age of Onset    Hypertension Mother     Osteopenia Mother     Arthritis Mother     Cerebrovascular Disease Mother     Anxiety Disorder Mother     Mental Illness Mother     Obesity Mother     Cancer Maternal Grandmother     Colon Cancer Maternal Grandmother     Obesity Maternal Grandmother     Cerebrovascular Disease Maternal Grandfather          Current Outpatient Medications   Medication Sig Dispense Refill    esomeprazole (NEXIUM) 40 MG DR capsule 22 mg      nortriptyline (PAMELOR) 50 MG capsule Take 1 capsule (50 mg) by mouth at bedtime. 90 capsule 3    SUMAtriptan (IMITREX) 100 MG tablet Take 1 tablet (100 mg) by mouth at onset of headache for migraine. May repeat dose after 2 hours if needed. Max daily dose 200 mg/24 hrs 10 tablet 11         Review of Systems  Constitutional, HEENT, cardiovascular, pulmonary, gi and gu systems are negative, except as otherwise noted.     Objective    Exam  /62 (Cuff Size: Adult Regular)   Pulse 88   Temp 98.1  F (36.7  C)   Ht 1.562 m (5' 1.5\")   Wt 73.9 kg (163 lb)   LMP 03/08/2025 (Exact Date)   SpO2 99%   BMI 30.30 kg/m     Estimated body mass index is 30.3 kg/m  as calculated from the following:    Height as of this encounter: 1.562 m (5' 1.5\").    Weight as of this encounter: 73.9 kg (163 lb).    Physical Exam    GENERAL: alert and no distress  EYES: Eyes grossly normal to inspection  HENT: ear canals and TM's normal, nose and mouth without ulcers or lesions  NECK: no adenopathy, no asymmetry, masses, or scars  RESP: lungs clear to auscultation - no rales, rhonchi or wheezes  CV: regular rate and rhythm, normal S1 S2, no S3 or S4, no murmur, click or rub, no peripheral edema  ABDOMEN: soft, nontender, bowel sounds normal  MS: no gross musculoskeletal defects noted, no edema  SKIN: no suspicious " lesions or rashes  NEURO: Normal strength and tone, mentation intact and speech normal  PSYCH: mentation appears normal, affect normal/bright        Signed Electronically by: OXANA Grande CNP

## 2025-03-13 NOTE — PATIENT INSTRUCTIONS
Patient Education   Preventive Care Advice   This is general advice given by our system to help you stay healthy. However, your care team may have specific advice just for you. Please talk to your care team about your preventive care needs.  Nutrition  Eat 5 or more servings of fruits and vegetables each day.  Try wheat bread, brown rice and whole grain pasta (instead of white bread, rice, and pasta).  Get enough calcium and vitamin D. Check the label on foods and aim for 100% of the RDA (recommended daily allowance).  Lifestyle  Exercise at least 150 minutes each week  (30 minutes a day, 5 days a week).  Do muscle strengthening activities 2 days a week. These help control your weight and prevent disease.  No smoking.  Wear sunscreen to prevent skin cancer.  Have a dental exam and cleaning every 6 months.  Yearly exams  See your health care team every year to talk about:  Any changes in your health.  Any medicines your care team has prescribed.  Preventive care, family planning, and ways to prevent chronic diseases.  Shots (vaccines)   HPV shots (up to age 26), if you've never had them before.  Hepatitis B shots (up to age 59), if you've never had them before.  COVID-19 shot: Get this shot when it's due.  Flu shot: Get a flu shot every year.  Tetanus shot: Get a tetanus shot every 10 years.  Pneumococcal, hepatitis A, and RSV shots: Ask your care team if you need these based on your risk.  Shingles shot (for age 50 and up)  General health tests  Diabetes screening:  Starting at age 35, Get screened for diabetes at least every 3 years.  If you are younger than age 35, ask your care team if you should be screened for diabetes.  Cholesterol test: At age 39, start having a cholesterol test every 5 years, or more often if advised.  Bone density scan (DEXA): At age 50, ask your care team if you should have this scan for osteoporosis (brittle bones).  Hepatitis C: Get tested at least once in your life.  STIs (sexually  transmitted infections)  Before age 24: Ask your care team if you should be screened for STIs.  After age 24: Get screened for STIs if you're at risk. You are at risk for STIs (including HIV) if:  You are sexually active with more than one person.  You don't use condoms every time.  You or a partner was diagnosed with a sexually transmitted infection.  If you are at risk for HIV, ask about PrEP medicine to prevent HIV.  Get tested for HIV at least once in your life, whether you are at risk for HIV or not.  Cancer screening tests  Cervical cancer screening: If you have a cervix, begin getting regular cervical cancer screening tests starting at age 21.  Breast cancer scan (mammogram): If you've ever had breasts, begin having regular mammograms starting at age 40. This is a scan to check for breast cancer.  Colon cancer screening: It is important to start screening for colon cancer at age 45.  Have a colonoscopy test every 10 years (or more often if you're at risk) Or, ask your provider about stool tests like a FIT test every year or Cologuard test every 3 years.  To learn more about your testing options, visit:   .  For help making a decision, visit:   https://bit.ly/bp80340.  Prostate cancer screening test: If you have a prostate, ask your care team if a prostate cancer screening test (PSA) at age 55 is right for you.  Lung cancer screening: If you are a current or former smoker ages 50 to 80, ask your care team if ongoing lung cancer screenings are right for you.  For informational purposes only. Not to replace the advice of your health care provider. Copyright   2023 [employer]. All rights reserved. Clinically reviewed by the [facility]. [employer] [id number] - REV 01/24.

## 2025-03-15 LAB
ALBUMIN SERPL BCG-MCNC: 4.3 G/DL (ref 3.5–5.2)
ALP SERPL-CCNC: 90 U/L (ref 40–150)
ALT SERPL W P-5'-P-CCNC: 17 U/L (ref 0–50)
ANION GAP SERPL CALCULATED.3IONS-SCNC: 11 MMOL/L (ref 7–15)
AST SERPL W P-5'-P-CCNC: 22 U/L (ref 0–45)
BILIRUB SERPL-MCNC: 0.3 MG/DL
BUN SERPL-MCNC: 10.6 MG/DL (ref 6–20)
CALCIUM SERPL-MCNC: 9.6 MG/DL (ref 8.8–10.4)
CHLORIDE SERPL-SCNC: 106 MMOL/L (ref 98–107)
CREAT SERPL-MCNC: 0.69 MG/DL (ref 0.51–0.95)
EGFRCR SERPLBLD CKD-EPI 2021: >90 ML/MIN/1.73M2
FASTING STATUS PATIENT QL REPORTED: YES
GLUCOSE SERPL-MCNC: 80 MG/DL (ref 70–99)
HCO3 SERPL-SCNC: 24 MMOL/L (ref 22–29)
POTASSIUM SERPL-SCNC: 4.4 MMOL/L (ref 3.4–5.3)
PROT SERPL-MCNC: 7.3 G/DL (ref 6.4–8.3)
SODIUM SERPL-SCNC: 141 MMOL/L (ref 135–145)

## 2025-03-17 ENCOUNTER — ANESTHESIA EVENT (OUTPATIENT)
Dept: SURGERY | Facility: HOSPITAL | Age: 40
End: 2025-03-17
Payer: COMMERCIAL

## 2025-03-18 ENCOUNTER — HOSPITAL ENCOUNTER (OUTPATIENT)
Facility: HOSPITAL | Age: 40
Discharge: HOME | End: 2025-03-18
Attending: OBSTETRICS & GYNECOLOGY | Admitting: OBSTETRICS & GYNECOLOGY
Payer: COMMERCIAL

## 2025-03-18 ENCOUNTER — ANESTHESIA (OUTPATIENT)
Dept: SURGERY | Facility: HOSPITAL | Age: 40
End: 2025-03-18
Payer: COMMERCIAL

## 2025-03-18 VITALS
BODY MASS INDEX: 29.74 KG/M2 | SYSTOLIC BLOOD PRESSURE: 115 MMHG | DIASTOLIC BLOOD PRESSURE: 68 MMHG | OXYGEN SATURATION: 99 % | TEMPERATURE: 97 F | RESPIRATION RATE: 14 BRPM | HEART RATE: 89 BPM | WEIGHT: 160 LBS

## 2025-03-18 DIAGNOSIS — Z90.710 STATUS POST HYSTERECTOMY: Primary | ICD-10-CM

## 2025-03-18 LAB
ABO + RH BLD: NORMAL
BLD GP AB SCN SERPL QL: NEGATIVE
HCG INTACT+B SERPL-ACNC: <1 MIU/ML
HGB BLD-MCNC: 12.9 G/DL (ref 11.7–15.7)
SPECIMEN EXP DATE BLD: NORMAL

## 2025-03-18 PROCEDURE — 272N000001 HC OR GENERAL SUPPLY STERILE: Performed by: OBSTETRICS & GYNECOLOGY

## 2025-03-18 PROCEDURE — 88305 TISSUE EXAM BY PATHOLOGIST: CPT | Mod: TC | Performed by: OBSTETRICS & GYNECOLOGY

## 2025-03-18 PROCEDURE — 710N000009 HC RECOVERY PHASE 1, LEVEL 1, PER MIN: Performed by: OBSTETRICS & GYNECOLOGY

## 2025-03-18 PROCEDURE — 250N000011 HC RX IP 250 OP 636: Mod: JZ | Performed by: ANESTHESIOLOGY

## 2025-03-18 PROCEDURE — 85018 HEMOGLOBIN: CPT | Performed by: OBSTETRICS & GYNECOLOGY

## 2025-03-18 PROCEDURE — 250N000011 HC RX IP 250 OP 636: Performed by: NURSE ANESTHETIST, CERTIFIED REGISTERED

## 2025-03-18 PROCEDURE — 250N000013 HC RX MED GY IP 250 OP 250 PS 637: Performed by: ANESTHESIOLOGY

## 2025-03-18 PROCEDURE — 250N000013 HC RX MED GY IP 250 OP 250 PS 637: Performed by: OBSTETRICS & GYNECOLOGY

## 2025-03-18 PROCEDURE — 36415 COLL VENOUS BLD VENIPUNCTURE: CPT | Performed by: OBSTETRICS & GYNECOLOGY

## 2025-03-18 PROCEDURE — 86901 BLOOD TYPING SEROLOGIC RH(D): CPT | Performed by: OBSTETRICS & GYNECOLOGY

## 2025-03-18 PROCEDURE — 64488 TAP BLOCK BI INJECTION: CPT | Mod: XU

## 2025-03-18 PROCEDURE — 999N000141 HC STATISTIC PRE-PROCEDURE NURSING ASSESSMENT: Performed by: OBSTETRICS & GYNECOLOGY

## 2025-03-18 PROCEDURE — 258N000001 HC RX 258: Performed by: OBSTETRICS & GYNECOLOGY

## 2025-03-18 PROCEDURE — 360N000077 HC SURGERY LEVEL 4, PER MIN: Performed by: OBSTETRICS & GYNECOLOGY

## 2025-03-18 PROCEDURE — 370N000017 HC ANESTHESIA TECHNICAL FEE, PER MIN: Performed by: OBSTETRICS & GYNECOLOGY

## 2025-03-18 PROCEDURE — 250N000009 HC RX 250: Performed by: ANESTHESIOLOGY

## 2025-03-18 PROCEDURE — 250N000025 HC SEVOFLURANE, PER MIN: Performed by: OBSTETRICS & GYNECOLOGY

## 2025-03-18 PROCEDURE — 258N000003 HC RX IP 258 OP 636: Performed by: ANESTHESIOLOGY

## 2025-03-18 PROCEDURE — 84702 CHORIONIC GONADOTROPIN TEST: CPT | Performed by: OBSTETRICS & GYNECOLOGY

## 2025-03-18 PROCEDURE — 710N000012 HC RECOVERY PHASE 2, PER MINUTE: Performed by: OBSTETRICS & GYNECOLOGY

## 2025-03-18 PROCEDURE — 250N000011 HC RX IP 250 OP 636: Performed by: OBSTETRICS & GYNECOLOGY

## 2025-03-18 PROCEDURE — 250N000011 HC RX IP 250 OP 636: Mod: JZ | Performed by: OBSTETRICS & GYNECOLOGY

## 2025-03-18 PROCEDURE — 250N000009 HC RX 250: Performed by: NURSE ANESTHETIST, CERTIFIED REGISTERED

## 2025-03-18 RX ORDER — ONDANSETRON 2 MG/ML
4 INJECTION INTRAMUSCULAR; INTRAVENOUS EVERY 30 MIN PRN
Status: DISCONTINUED | OUTPATIENT
Start: 2025-03-18 | End: 2025-03-18 | Stop reason: HOSPADM

## 2025-03-18 RX ORDER — NALOXONE HYDROCHLORIDE 1 MG/ML
0.1 INJECTION INTRAMUSCULAR; INTRAVENOUS; SUBCUTANEOUS
Status: DISCONTINUED | OUTPATIENT
Start: 2025-03-18 | End: 2025-03-18 | Stop reason: HOSPADM

## 2025-03-18 RX ORDER — ACETAMINOPHEN 325 MG/1
975 TABLET ORAL ONCE
Status: COMPLETED | OUTPATIENT
Start: 2025-03-18 | End: 2025-03-18

## 2025-03-18 RX ORDER — MAGNESIUM SULFATE 4 G/50ML
4 INJECTION INTRAVENOUS ONCE
Status: DISCONTINUED | OUTPATIENT
Start: 2025-03-18 | End: 2025-03-18

## 2025-03-18 RX ORDER — ACETAMINOPHEN 325 MG/1
975 TABLET ORAL ONCE
Status: DISCONTINUED | OUTPATIENT
Start: 2025-03-18 | End: 2025-03-18

## 2025-03-18 RX ORDER — DEXAMETHASONE SODIUM PHOSPHATE 4 MG/ML
4 INJECTION, SOLUTION INTRA-ARTICULAR; INTRALESIONAL; INTRAMUSCULAR; INTRAVENOUS; SOFT TISSUE
Status: DISCONTINUED | OUTPATIENT
Start: 2025-03-18 | End: 2025-03-18 | Stop reason: HOSPADM

## 2025-03-18 RX ORDER — SCOPOLAMINE 1 MG/3D
1 PATCH, EXTENDED RELEASE TRANSDERMAL ONCE
Status: DISCONTINUED | OUTPATIENT
Start: 2025-03-18 | End: 2025-03-18 | Stop reason: HOSPADM

## 2025-03-18 RX ORDER — DEXAMETHASONE SODIUM PHOSPHATE 10 MG/ML
INJECTION, SOLUTION INTRAMUSCULAR; INTRAVENOUS PRN
Status: DISCONTINUED | OUTPATIENT
Start: 2025-03-18 | End: 2025-03-18

## 2025-03-18 RX ORDER — SODIUM CHLORIDE, SODIUM LACTATE, POTASSIUM CHLORIDE, CALCIUM CHLORIDE 600; 310; 30; 20 MG/100ML; MG/100ML; MG/100ML; MG/100ML
INJECTION, SOLUTION INTRAVENOUS CONTINUOUS
Status: DISCONTINUED | OUTPATIENT
Start: 2025-03-18 | End: 2025-03-18 | Stop reason: HOSPADM

## 2025-03-18 RX ORDER — CEFAZOLIN SODIUM/WATER 2 G/20 ML
2 SYRINGE (ML) INTRAVENOUS SEE ADMIN INSTRUCTIONS
Status: DISCONTINUED | OUTPATIENT
Start: 2025-03-18 | End: 2025-03-18 | Stop reason: HOSPADM

## 2025-03-18 RX ORDER — BUPIVACAINE HYDROCHLORIDE 2.5 MG/ML
INJECTION, SOLUTION EPIDURAL; INFILTRATION; INTRACAUDAL; PERINEURAL
Status: COMPLETED | OUTPATIENT
Start: 2025-03-18 | End: 2025-03-18

## 2025-03-18 RX ORDER — NALOXONE HYDROCHLORIDE 0.4 MG/ML
0.1 INJECTION, SOLUTION INTRAMUSCULAR; INTRAVENOUS; SUBCUTANEOUS
Status: DISCONTINUED | OUTPATIENT
Start: 2025-03-18 | End: 2025-03-18 | Stop reason: HOSPADM

## 2025-03-18 RX ORDER — FENTANYL CITRATE 50 UG/ML
25-100 INJECTION, SOLUTION INTRAMUSCULAR; INTRAVENOUS
Status: DISCONTINUED | OUTPATIENT
Start: 2025-03-18 | End: 2025-03-18 | Stop reason: HOSPADM

## 2025-03-18 RX ORDER — FENTANYL CITRATE 50 UG/ML
50 INJECTION, SOLUTION INTRAMUSCULAR; INTRAVENOUS EVERY 5 MIN PRN
Status: DISCONTINUED | OUTPATIENT
Start: 2025-03-18 | End: 2025-03-18 | Stop reason: HOSPADM

## 2025-03-18 RX ORDER — OXYCODONE HYDROCHLORIDE 5 MG/1
10 TABLET ORAL
Status: DISCONTINUED | OUTPATIENT
Start: 2025-03-18 | End: 2025-03-18 | Stop reason: HOSPADM

## 2025-03-18 RX ORDER — PHENAZOPYRIDINE HYDROCHLORIDE 100 MG/1
200 TABLET, FILM COATED ORAL ONCE
Status: COMPLETED | OUTPATIENT
Start: 2025-03-18 | End: 2025-03-18

## 2025-03-18 RX ORDER — LIDOCAINE HYDROCHLORIDE 10 MG/ML
INJECTION, SOLUTION INFILTRATION; PERINEURAL PRN
Status: DISCONTINUED | OUTPATIENT
Start: 2025-03-18 | End: 2025-03-18

## 2025-03-18 RX ORDER — ONDANSETRON 4 MG/1
4 TABLET, ORALLY DISINTEGRATING ORAL EVERY 8 HOURS PRN
Qty: 4 TABLET | Refills: 0 | Status: SHIPPED | OUTPATIENT
Start: 2025-03-18

## 2025-03-18 RX ORDER — MAGNESIUM SULFATE 4 G/50ML
4 INJECTION INTRAVENOUS ONCE
Status: COMPLETED | OUTPATIENT
Start: 2025-03-18 | End: 2025-03-18

## 2025-03-18 RX ORDER — FLUMAZENIL 0.1 MG/ML
0.2 INJECTION, SOLUTION INTRAVENOUS
Status: DISCONTINUED | OUTPATIENT
Start: 2025-03-18 | End: 2025-03-18 | Stop reason: HOSPADM

## 2025-03-18 RX ORDER — IBUPROFEN 200 MG
800 TABLET ORAL ONCE
Status: COMPLETED | OUTPATIENT
Start: 2025-03-18 | End: 2025-03-18

## 2025-03-18 RX ORDER — FENTANYL CITRATE 50 UG/ML
25 INJECTION, SOLUTION INTRAMUSCULAR; INTRAVENOUS EVERY 5 MIN PRN
Status: DISCONTINUED | OUTPATIENT
Start: 2025-03-18 | End: 2025-03-18 | Stop reason: HOSPADM

## 2025-03-18 RX ORDER — OXYCODONE HYDROCHLORIDE 5 MG/1
5 TABLET ORAL
Status: COMPLETED | OUTPATIENT
Start: 2025-03-18 | End: 2025-03-18

## 2025-03-18 RX ORDER — LIDOCAINE 40 MG/G
CREAM TOPICAL
Status: DISCONTINUED | OUTPATIENT
Start: 2025-03-18 | End: 2025-03-18 | Stop reason: HOSPADM

## 2025-03-18 RX ORDER — PROPOFOL 10 MG/ML
INJECTION, EMULSION INTRAVENOUS PRN
Status: DISCONTINUED | OUTPATIENT
Start: 2025-03-18 | End: 2025-03-18

## 2025-03-18 RX ORDER — ONDANSETRON 4 MG/1
4 TABLET, ORALLY DISINTEGRATING ORAL EVERY 30 MIN PRN
Status: DISCONTINUED | OUTPATIENT
Start: 2025-03-18 | End: 2025-03-18 | Stop reason: HOSPADM

## 2025-03-18 RX ORDER — FENTANYL CITRATE 50 UG/ML
INJECTION, SOLUTION INTRAMUSCULAR; INTRAVENOUS PRN
Status: DISCONTINUED | OUTPATIENT
Start: 2025-03-18 | End: 2025-03-18

## 2025-03-18 RX ORDER — METRONIDAZOLE 500 MG/100ML
500 INJECTION, SOLUTION INTRAVENOUS
Status: COMPLETED | OUTPATIENT
Start: 2025-03-18 | End: 2025-03-18

## 2025-03-18 RX ORDER — DEXAMETHASONE SODIUM PHOSPHATE 10 MG/ML
4 INJECTION, SOLUTION INTRAMUSCULAR; INTRAVENOUS
Status: DISCONTINUED | OUTPATIENT
Start: 2025-03-18 | End: 2025-03-18 | Stop reason: HOSPADM

## 2025-03-18 RX ORDER — CEFAZOLIN SODIUM/WATER 2 G/20 ML
2 SYRINGE (ML) INTRAVENOUS
Status: COMPLETED | OUTPATIENT
Start: 2025-03-18 | End: 2025-03-18

## 2025-03-18 RX ORDER — ONDANSETRON 2 MG/ML
INJECTION INTRAMUSCULAR; INTRAVENOUS PRN
Status: DISCONTINUED | OUTPATIENT
Start: 2025-03-18 | End: 2025-03-18

## 2025-03-18 RX ORDER — HYDROMORPHONE HYDROCHLORIDE 2 MG/1
2 TABLET ORAL EVERY 4 HOURS PRN
Qty: 6 TABLET | Refills: 0 | Status: SHIPPED | OUTPATIENT
Start: 2025-03-18

## 2025-03-18 RX ORDER — HYDROMORPHONE HCL IN WATER/PF 6 MG/30 ML
0.2 PATIENT CONTROLLED ANALGESIA SYRINGE INTRAVENOUS EVERY 5 MIN PRN
Status: DISCONTINUED | OUTPATIENT
Start: 2025-03-18 | End: 2025-03-18 | Stop reason: HOSPADM

## 2025-03-18 RX ORDER — HYDROMORPHONE HCL IN WATER/PF 6 MG/30 ML
0.4 PATIENT CONTROLLED ANALGESIA SYRINGE INTRAVENOUS EVERY 5 MIN PRN
Status: DISCONTINUED | OUTPATIENT
Start: 2025-03-18 | End: 2025-03-18 | Stop reason: HOSPADM

## 2025-03-18 RX ORDER — PROPOFOL 10 MG/ML
INJECTION, EMULSION INTRAVENOUS CONTINUOUS PRN
Status: DISCONTINUED | OUTPATIENT
Start: 2025-03-18 | End: 2025-03-18

## 2025-03-18 RX ORDER — OXYCODONE HYDROCHLORIDE 5 MG/1
5 TABLET ORAL
Status: DISCONTINUED | OUTPATIENT
Start: 2025-03-18 | End: 2025-03-18 | Stop reason: HOSPADM

## 2025-03-18 RX ORDER — HEPARIN SODIUM 5000 [USP'U]/.5ML
5000 INJECTION, SOLUTION INTRAVENOUS; SUBCUTANEOUS
Status: COMPLETED | OUTPATIENT
Start: 2025-03-18 | End: 2025-03-18

## 2025-03-18 RX ORDER — KETAMINE HYDROCHLORIDE 10 MG/ML
INJECTION INTRAMUSCULAR; INTRAVENOUS PRN
Status: DISCONTINUED | OUTPATIENT
Start: 2025-03-18 | End: 2025-03-18

## 2025-03-18 RX ADMIN — KETAMINE HYDROCHLORIDE 30 MG: 10 INJECTION INTRAMUSCULAR; INTRAVENOUS at 14:19

## 2025-03-18 RX ADMIN — FENTANYL CITRATE 50 MCG: 50 INJECTION, SOLUTION INTRAMUSCULAR; INTRAVENOUS at 15:37

## 2025-03-18 RX ADMIN — OXYCODONE HYDROCHLORIDE 5 MG: 5 TABLET ORAL at 16:44

## 2025-03-18 RX ADMIN — SUGAMMADEX 280 MG: 100 INJECTION, SOLUTION INTRAVENOUS at 15:12

## 2025-03-18 RX ADMIN — ONDANSETRON 4 MG: 2 INJECTION INTRAMUSCULAR; INTRAVENOUS at 15:06

## 2025-03-18 RX ADMIN — METRONIDAZOLE 500 MG: 500 INJECTION, SOLUTION INTRAVENOUS at 11:52

## 2025-03-18 RX ADMIN — LIDOCAINE HYDROCHLORIDE 3 ML: 10 INJECTION, SOLUTION INFILTRATION; PERINEURAL at 13:31

## 2025-03-18 RX ADMIN — ROCURONIUM 50 MG: 50 INJECTION, SOLUTION INTRAVENOUS at 13:31

## 2025-03-18 RX ADMIN — FENTANYL CITRATE 50 MCG: 50 INJECTION, SOLUTION INTRAMUSCULAR; INTRAVENOUS at 13:47

## 2025-03-18 RX ADMIN — HYDROMORPHONE HYDROCHLORIDE 0.4 MG: 0.2 INJECTION, SOLUTION INTRAMUSCULAR; INTRAVENOUS; SUBCUTANEOUS at 16:13

## 2025-03-18 RX ADMIN — FENTANYL CITRATE 50 MCG: 50 INJECTION, SOLUTION INTRAMUSCULAR; INTRAVENOUS at 15:31

## 2025-03-18 RX ADMIN — HYDROMORPHONE HYDROCHLORIDE 0.4 MG: 0.2 INJECTION, SOLUTION INTRAMUSCULAR; INTRAVENOUS; SUBCUTANEOUS at 16:00

## 2025-03-18 RX ADMIN — HYDROMORPHONE HYDROCHLORIDE 0.4 MG: 0.2 INJECTION, SOLUTION INTRAMUSCULAR; INTRAVENOUS; SUBCUTANEOUS at 16:36

## 2025-03-18 RX ADMIN — DEXAMETHASONE SODIUM PHOSPHATE 10 MG: 10 INJECTION, SOLUTION INTRAMUSCULAR; INTRAVENOUS at 13:46

## 2025-03-18 RX ADMIN — HEPARIN SODIUM 5000 UNITS: 5000 INJECTION, SOLUTION INTRAVENOUS; SUBCUTANEOUS at 13:54

## 2025-03-18 RX ADMIN — PROPOFOL 160 MG: 10 INJECTION, EMULSION INTRAVENOUS at 13:31

## 2025-03-18 RX ADMIN — MIDAZOLAM HYDROCHLORIDE 2 MG: 1 INJECTION, SOLUTION INTRAMUSCULAR; INTRAVENOUS at 13:17

## 2025-03-18 RX ADMIN — Medication 2 G: at 13:17

## 2025-03-18 RX ADMIN — KETAMINE HYDROCHLORIDE 20 MG: 10 INJECTION INTRAMUSCULAR; INTRAVENOUS at 13:31

## 2025-03-18 RX ADMIN — HYDROMORPHONE HYDROCHLORIDE 0.4 MG: 0.2 INJECTION, SOLUTION INTRAMUSCULAR; INTRAVENOUS; SUBCUTANEOUS at 15:46

## 2025-03-18 RX ADMIN — PROPOFOL 50 MCG/KG/MIN: 10 INJECTION, EMULSION INTRAVENOUS at 13:37

## 2025-03-18 RX ADMIN — ROCURONIUM 20 MG: 50 INJECTION, SOLUTION INTRAVENOUS at 14:37

## 2025-03-18 RX ADMIN — SCOPOLAMINE 1 PATCH: 1.5 PATCH, EXTENDED RELEASE TRANSDERMAL at 12:01

## 2025-03-18 RX ADMIN — MAGNESIUM SULFATE HEPTAHYDRATE 4 G: 80 INJECTION, SOLUTION INTRAVENOUS at 11:47

## 2025-03-18 RX ADMIN — PHENAZOPYRIDINE 200 MG: 100 TABLET ORAL at 11:58

## 2025-03-18 RX ADMIN — SODIUM CHLORIDE, SODIUM LACTATE, POTASSIUM CHLORIDE, AND CALCIUM CHLORIDE: .6; .31; .03; .02 INJECTION, SOLUTION INTRAVENOUS at 11:55

## 2025-03-18 RX ADMIN — FENTANYL CITRATE 50 MCG: 50 INJECTION, SOLUTION INTRAMUSCULAR; INTRAVENOUS at 13:31

## 2025-03-18 RX ADMIN — SODIUM CHLORIDE, SODIUM LACTATE, POTASSIUM CHLORIDE, AND CALCIUM CHLORIDE: .6; .31; .03; .02 INJECTION, SOLUTION INTRAVENOUS at 14:33

## 2025-03-18 RX ADMIN — BUPIVACAINE HYDROCHLORIDE 40 ML: 2.5 INJECTION, SOLUTION EPIDURAL; INFILTRATION; INTRACAUDAL; PERINEURAL at 13:45

## 2025-03-18 RX ADMIN — HYDROMORPHONE HYDROCHLORIDE 0.4 MG: 0.2 INJECTION, SOLUTION INTRAMUSCULAR; INTRAVENOUS; SUBCUTANEOUS at 16:23

## 2025-03-18 RX ADMIN — ACETAMINOPHEN 975 MG: 325 TABLET ORAL at 11:58

## 2025-03-18 ASSESSMENT — ACTIVITIES OF DAILY LIVING (ADL)
ADLS_ACUITY_SCORE: 18
ADLS_ACUITY_SCORE: 41
ADLS_ACUITY_SCORE: 18

## 2025-03-18 NOTE — ANESTHESIA CARE TRANSFER NOTE
Patient: Consuelo Cyr    Procedure: Procedure(s):  TOTAL LAPAROSCOPIC HYSTERECTOMY,  UTEROSACRAL LIGAMENT SUSPENSION, BILATERAL SALPINGECTOMY, CYSTOSCOPY       Diagnosis: Abnormal uterine bleeding [N93.9]  Pelvic pain [R10.2]  Diagnosis Additional Information: No value filed.    Anesthesia Type:   General     Note:    Oropharynx: oropharynx clear of all foreign objects and spontaneously breathing  Level of Consciousness: drowsy  Oxygen Supplementation: face mask  Level of Supplemental Oxygen (L/min / FiO2): 8  Independent Airway: airway patency satisfactory and stable  Dentition: dentition unchanged  Vital Signs Stable: post-procedure vital signs reviewed and stable  Report to RN Given: handoff report given  Patient transferred to: PACU    Handoff Report: Identifed the Patient, Identified the Reponsible Provider, Reviewed the pertinent medical history, Discussed the surgical course, Reviewed Intra-OP anesthesia mangement and issues during anesthesia, Set expectations for post-procedure period and Allowed opportunity for questions and acknowledgement of understanding  Vitals:  Vitals Value Taken Time   /59 03/18/25 1525   Temp     Pulse 79 03/18/25 1528   Resp 22 03/18/25 1528   SpO2 100 % 03/18/25 1528   Vitals shown include unfiled device data.    Electronically Signed By: OXANA Workman CRNA  March 18, 2025  3:30 PM

## 2025-03-18 NOTE — OP NOTE
OPERATIVE REPORT     Name: Consuelo Cyr  MRN: [id number]  CSN: [id number]  Procedure date: 3/18/2025    PRE-OP DIAGNOSIS: 39yo F with dysmenorrhea resistant to medical management  POST-OPERATIVE DIAGNOSIS: Same  OPERATION: Total laparoscopic hysterectomy, bilateral salpingectomy, uterosacral ligament suspension, cystoscopy  ASSISTANT(S): Circulator: Boecker, Susan M, RN; Mendel Madrigal RN; Carol Dee RN  Scrub Person: Altagracia Garcia; Rose Solitario  ANESTHESIA: General ET  INTRAVENOUS FLUIDS: see anesthesia record  URINE OUTPUT: 300 ML.  ESTIMATED BLOOD LOSS: 15 ML.  SPECIMENS: Uterus and cervix, bilateral fallopian tubes  ANTIBIOTICS: Cephazolin 2g, Flagyl 500mg given prior to skin incision.    FINDINGS:   EUA - 7cm mobile uterus, no adnexal masses  LSC - 7cm uterus, normal adnexa bilaterally, no adhesive disease or endometriosis, normal appendix and intraabdominal anatomy.  Cystoscopy - intact bladder, bilateral ureteral orifices seen effluxing urine    PROCEDURE   The patient was informed of the risks, benefits, and alternatives of the above-mentioned procedures and the consent was signed and witnessed. The patient was taken to the operating room and placed in the supine position. General endotracheal anesthesia was achieved without difficulty. The patient was given preoperative prophylactic intravenous antibiotics. The patient was then placed in the dorsal lithotomy position using Pankaj stirrups. All pressure points were avoided or padded appropriately and a Henna Hugger  was placed to maintain control of core body temperature. Both arms were tucked in anatomical position at the patient s side. The patient was then examined, prepped and draped in the usual sterile fashion. Exam under anesthesia as noted above. A cowan catheter was introduced into the bladder. A speculum was placed into the vagina and the anterior lip of the cervix grasped with a single tooth tenaculum. The uterus was sounded  to 6 cm. A simple 0-Vicryl stitch was placed on the anterior lip of the cervix and the ends threaded through the green cup of the V care. A V care uterine manipulator was introduced into the uterus. The tenaculum was removed and the puncture sites hemostatic. The speculum was removed from the vagina.    Attention was returned to the abdomen where an infraumbilial incision was made to accommodate a 5mm port. A Veress needle was used to enter the abdominal cavity and correct placement was confirmed with the drop test and correct pressure with the insufflation flow. Abdomen was insufflated with CO2 gas. After adequate insufflation was obtained a 5mm trocar was inserted at this position. Atraumatic entry was confirmed, the abdomen was inspected with the findings noted above. The patient was placed into trendelenburg and a 5mm trocar was inserted in the LLQ and a 11mm trocar was inserted in the RLQ.     Attention was then turned to the laparoscopic hysterectomy. Bilaterally the utero-ovarian and round ligaments were sealed and divided using the ligasure. The anterior and posterior leafs of the broad ligament were dissected bilaterally.  The bladder flap was developed anteriorly, and the bladder peritoneum was sharply and bluntly dissected off of the lower uterine segment. The uterine arteries were sealed and divided using the Ligasure device bilaterally. Additional pedicles were developed on each side of the cervix using the Ligasure down to the level of the colpotomy ring. The vagina was transected using a monopolar spatula, resulting in separation of the specimen. The uterus was removed through the vagina.     The bilateral fallopian tubes were then removed from their attachments with the ligasure and removed from the abdomen. Excellent hemostasis was noted after removal.     The vaginal cuff was then closed with 2-0 v-lock with the Endostitch. The uterosacral ligaments were incorporated into the cuff and suspended  appropriately.  Pelvis was irrigated and good hemostasis was confirmed. Yifan placed over the cuff.     We then performed cystoscopy. Quinn catheter was removed and a 70-degree laparoscope was introduced into the bladder. An intact bladder was seen and bilateral ureteral jets was demonstrated     Skin incisions were closed with 4-0 Monocryl, patient received a tap block prior to the procedure. Patient was taken to the recovery room in a stable condition.      DO SUN[facility]

## 2025-03-18 NOTE — ANESTHESIA PREPROCEDURE EVALUATION
Anesthesia Pre-Procedure Evaluation    Patient: Consuelo Cyr   MRN: [id number] : 1985        Procedure : Procedure(s):  TOTAL LAPAROSCOPIC HYSTERECTOMY,  UTEROSACRAL LIGAMENT SUSPENSION, BILATERAL SALPINGECTOMY, CYSTOSCOPY          Past Medical History:   Diagnosis Date    Arthritis Early     I have it in my knees and low back    GERD (gastroesophageal reflux disease)     Heart palpitations     History of cholecystectomy 2016    Migraine       Past Surgical History:   Procedure Laterality Date    CHOLECYSTECTOMY        Allergies   Allergen Reactions    Ceftin [Cefuroxime] GI Disturbance    Corn-Containing Products GI Disturbance    Dairy Digestive Diarrhea, GI Disturbance and Headache    Doxycycline Other (See Comments)     Racing heart, itchy scalp, tingling    Levaquin [Levofloxacin] GI Disturbance    Nitrofurantoin Other (See Comments)     Tingling, racing heart, itchy scalp    Sulfa Antibiotics Hives    Wheat Gluten [Gluten Meal] GI Disturbance    Zithromax [Azithromycin] Hives      Social History     Tobacco Use    Smoking status: Former     Current packs/day: 0.00     Types: Cigarettes     Quit date: 12/3/2002     Years since quittin.3    Smokeless tobacco: Never   Substance Use Topics    Alcohol use: Yes     Comment: Occasionally, might equate to 1-2 per month      Wt Readings from Last 1 Encounters:   25 72.6 kg (160 lb)        Anesthesia Evaluation   Pt has had prior anesthetic. Type: General.        ROS/MED HX  ENT/Pulmonary:  - neg pulmonary ROS     Neurologic:  - neg neurologic ROS   (+)      migraines,                          Cardiovascular:  - neg cardiovascular ROS     METS/Exercise Tolerance:     Hematologic:  - neg hematologic  ROS     Musculoskeletal:  - neg musculoskeletal ROS     GI/Hepatic:     (+) GERD,                   Renal/Genitourinary:  - neg Renal ROS     Endo:  - neg endo ROS     Psychiatric/Substance Use:  - neg psychiatric ROS     Infectious  "Disease:  - neg infectious disease ROS     Malignancy:  - neg malignancy ROS     Other:            Physical Exam    Airway        Mallampati: II    Neck ROM: full     Respiratory Devices and Support         Dental           Cardiovascular   cardiovascular exam normal          Pulmonary   pulmonary exam normal                OUTSIDE LABS:  CBC:   Lab Results   Component Value Date    WBC 4.2 03/13/2025    HGB 12.9 03/18/2025    HGB 13.5 03/13/2025    HCT 40.0 03/13/2025     03/13/2025     BMP:   Lab Results   Component Value Date     03/13/2025     03/19/2024    POTASSIUM 4.4 03/13/2025    POTASSIUM 3.8 03/19/2024    CHLORIDE 106 03/13/2025    CHLORIDE 103 03/19/2024    CO2 24 03/13/2025    CO2 27 03/19/2024    BUN 10.6 03/13/2025    BUN 10.2 03/19/2024    CR 0.69 03/13/2025    CR 0.69 03/19/2024    GLC 80 03/13/2025    GLC 83 03/19/2024     COAGS: No results found for: \"PTT\", \"INR\", \"FIBR\"  POC:   Lab Results   Component Value Date    HCG Negative 03/30/2021     HEPATIC:   Lab Results   Component Value Date    ALBUMIN 4.3 03/13/2025    PROTTOTAL 7.3 03/13/2025    ALT 17 03/13/2025    AST 22 03/13/2025    ALKPHOS 90 03/13/2025    BILITOTAL 0.3 03/13/2025     OTHER:   Lab Results   Component Value Date    AMERICA 9.6 03/13/2025    TSH 0.73 03/13/2025       Anesthesia Plan    ASA Status:  2       Anesthesia Type: General.     - Airway: ETT      Maintenance: Balanced.        Consents    Anesthesia Plan(s) and associated risks, benefits, and realistic alternatives discussed. Questions answered and patient/representative(s) expressed understanding.     - Discussed:     - Discussed with:  Patient            Postoperative Care    Pain management: Multi-modal analgesia.   PONV prophylaxis: Ondansetron (or other 5HT-3), Dexamethasone or Solumedrol, Scopolamine patch     Comments:    Other Comments: Mag sulfate   jason Amezquita MD    I have reviewed the pertinent notes and labs in the chart from " "the past 30 days and (re)examined the patient.  Any updates or changes from those notes are reflected in this note.    Clinically Significant Risk Factors Present on Admission                             # Overweight: Estimated body mass index is 29.74 kg/m  as calculated from the following:    Height as of 3/13/25: 1.562 m (5' 1.5\").    Weight as of this encounter: 72.6 kg (160 lb).                "

## 2025-03-18 NOTE — ANESTHESIA POSTPROCEDURE EVALUATION
Patient: Consuelo Cyr    Procedure: Procedure(s):  TOTAL LAPAROSCOPIC HYSTERECTOMY,  UTEROSACRAL LIGAMENT SUSPENSION, BILATERAL SALPINGECTOMY, CYSTOSCOPY       Anesthesia Type:  General    Note:  Disposition: Outpatient   Postop Pain Control: Uneventful            Sign Out: Well controlled pain   PONV: No   Neuro/Psych: Uneventful            Sign Out: Acceptable/Baseline neuro status   Airway/Respiratory: Uneventful            Sign Out: Acceptable/Baseline resp. status   CV/Hemodynamics: Uneventful            Sign Out: Acceptable CV status; No obvious hypovolemia; No obvious fluid overload   Other NRE: NONE   DID A NON-ROUTINE EVENT OCCUR? No           Last vitals:  Vitals Value Taken Time   /71 03/18/25 1630   Temp 35.9  C (96.6  F) 03/18/25 1600   Pulse 80 03/18/25 1632   Resp 14 03/18/25 1632   SpO2 96 % 03/18/25 1632   Vitals shown include unfiled device data.    Electronically Signed By: Sam Amezquita MD  March 18, 2025  4:34 PM

## 2025-03-18 NOTE — INTERVAL H&P NOTE
"I have reviewed the surgical (or preoperative) H&P that is linked to this encounter, and examined the patient. There are no significant changes    Clinical Conditions Present on Arrival:  Clinically Significant Risk Factors Present on Admission                       # Obesity: Estimated body mass index is 30.3 kg/m  as calculated from the following:    Height as of 3/13/25: 1.562 m (5' 1.5\").    Weight as of 3/13/25: 73.9 kg (163 lb).       "

## 2025-03-18 NOTE — H&P
History and Physical GYN    NAME:Consuelo Cyr  : 1985  MRN: [id number]    CHIEF COMPLAINT: Abnormal uterine bleeding [N93.9]  Pelvic pain [R10.2]  HPI:  Consuelo Cyr is a 40 year old  female.  She has had worsening pelvic pain during her menses for the past few years. She has bloating, constipation, worsening cramping on her menses, feels like she can't fully empty her bladder. No dyspareunia. Also notes she has had four years of night sweats, irregular hot flashes, and insomnia.     Had a pelvic ultrasound done at [place]:  Uterus 7.3x5.1x4.4cm, ES 5mm  Ultrasound has conflicting information - body of ultrasound read says subserosal fibroid measuring 1.7cm, Impression says intramural fibroid.  Reveiwed images myself - appears intramural   Normal ovaries bilaterally, no free fluid     OB history:  , all vaginal deliveries. Last delivery 10 years ago  No gyn surgeries previously  Abdominal surgery - cholecystectomy   PMH: Allergies, Migraines with Aura (takes nortrypteline)     Birth control - had previously done pills, patch, shot.  now has a vasectomy. She tried to go back on OCPs a few years ago but this worsened her migraines    Consuelo started Aygestin after her initial consultation however had a full body allergic reaction due to there being a corn substrate in this medication. She then trialed Slynd for ~1 week, in which her symptoms resolved. Unfortunately she started having an allergic reaction to this medication as well. She strongly desires definitive management at this time, especially now that it is a more likely GYN related source of pain vs GI     PMH:  Past Medical History:   Diagnosis Date    Arthritis Early     I have it in my knees and low back    GERD (gastroesophageal reflux disease)     Heart palpitations     History of cholecystectomy 2016    Migraine        PSH:  Past Surgical History:   Procedure Laterality Date    CHOLECYSTECTOMY          Social History:  Social History     Socioeconomic History    Marital status:      Spouse name: Not on file    Number of children: Not on file    Years of education: Not on file    Highest education level: Not on file   Occupational History    Not on file   Tobacco Use    Smoking status: Former     Current packs/day: 0.00     Types: Cigarettes     Quit date: 12/3/2002     Years since quittin.3    Smokeless tobacco: Never   Vaping Use    Vaping status: Never Used   Substance and Sexual Activity    Alcohol use: Yes     Comment: Occasionally, might equate to 1-2 per month    Drug use: Yes     Types: Marijuana    Sexual activity: Yes     Partners: Male     Birth control/protection: Male Surgical   Other Topics Concern    Parent/sibling w/ CABG, MI or angioplasty before 65F 55M? No   Social History Narrative    Not on file     Social Drivers of Health     Financial Resource Strain: Low Risk  (3/8/2025)    Financial Resource Strain     Within the past 12 months, have you or your family members you live with been unable to get utilities (heat, electricity) when it was really needed?: No   Food Insecurity: Low Risk  (3/8/2025)    Food Insecurity     Within the past 12 months, did you worry that your food would run out before you got money to buy more?: No     Within the past 12 months, did the food you bought just not last and you didn t have money to get more?: No   Transportation Needs: Low Risk  (3/8/2025)    Transportation Needs     Within the past 12 months, has lack of transportation kept you from medical appointments, getting your medicines, non-medical meetings or appointments, work, or from getting things that you need?: No   Physical Activity: Inactive (3/8/2025)    Exercise Vital Sign     Days of Exercise per Week: 0 days     Minutes of Exercise per Session: 0 min   Stress: Stress Concern Present (3/8/2025)    [employer] - Occupational Stress Questionnaire      Feeling of Stress : Very much   Social Connections: Unknown (3/8/2025)    Social Connection and Isolation Panel [NHANES]     Frequency of Communication with Friends and Family: Not on file     Frequency of Social Gatherings with Friends and Family: Once a week     Attends Nondenominational Services: Not on file     Active Member of Clubs or Organizations: Not on file     Attends Club or Organization Meetings: Not on file     Marital Status: Not on file   Interpersonal Safety: Low Risk  (9/6/2024)    Interpersonal Safety     Do you feel physically and emotionally safe where you currently live?: Yes     Within the past 12 months, have you been hit, slapped, kicked or otherwise physically hurt by someone?: No     Within the past 12 months, have you been humiliated or emotionally abused in other ways by your partner or ex-partner?: No   Housing Stability: Low Risk  (3/8/2025)    Housing Stability     Do you have housing? : Yes     Are you worried about losing your housing?: No       Medications:  No current facility-administered medications for this encounter.     Current Outpatient Medications   Medication Sig Dispense Refill    esomeprazole (NEXIUM) 40 MG DR capsule 22 mg      nortriptyline (PAMELOR) 50 MG capsule Take 1 capsule (50 mg) by mouth at bedtime. 90 capsule 3    SUMAtriptan (IMITREX) 100 MG tablet Take 1 tablet (100 mg) by mouth at onset of headache for migraine. May repeat dose after 2 hours if needed. Max daily dose 200 mg/24 hrs 10 tablet 11       Allergies:  Allergies   Allergen Reactions    Ceftin [Cefuroxime] GI Disturbance    Corn-Containing Products GI Disturbance    Dairy Digestive Diarrhea, GI Disturbance and Headache    Doxycycline Other (See Comments)     Racing heart, itchy scalp, tingling    Levaquin [Levofloxacin] GI Disturbance    Nitrofurantoin Other (See Comments)     Tingling, racing heart, itchy scalp    Sulfa Antibiotics Hives    Wheat Gluten [Gluten Meal] GI Disturbance    Zithromax  [Azithromycin] Hives       Review of Systems   Negative except what is stated in the HPI    Physical exam:  There were no vitals taken for this visit.     General Appearance: Alert, appropriate appearance for age. No acute distress,   HEENT Exam: Grossly normal.  Chest/Respiratory Exam: Normal chest wall and respirations. Clear to auscultation.  Cardiovascular Exam: Regular rate and rhythm. S1, S2, no murmur, click, gallop, or rubs.,   Gastrointestinal Exam: soft, non-tender; bowel sounds normal; no masses,  no organomegaly  Pelvic Exam Female:  deferred to OR  Psychiatric Exam: Alert and oriented, appropriate affect.      ASSESSMENT/PLAN:    1. Abnormal uterine bleeding (AUB)   Reviewed her symptoms and ultrasound today. Although there is a small fibroid, I have low suspicion this is causing her symptoms.  More likely etiologies include endometriosis, adenomyosis, or GI related. Discussed treatments for endometriosis and adenomyosis. She is not a candidate for OCPs, and if her symptoms are related to this an endometrial ablation would not be a good option for her.  Treatments reviewed included expectant, oral progesterone, IUD, other medical management, and hysterectomy. We agreed that trialing oral progesterone is the best option at this time because it would allow us to rule in or out a GYN etiology of her pain. If her pain resolves with this treatment we can decide if continuing Aygestin is desired vs more invasive surgical options. If her symptoms are unchanged I would recommend evaluation by GI    AUB labs and hormone levels checked for completeness and were within normal limits    ADDENDUM: Patient trialed Aygestin and Slynd. She did have symptomatic improvement on these medications but unfortunately had an allergic reaction to both. Discussed that her symptoms are likely due to endometriosis or adenomyosis given that she felt better with progesterone. We discussed an IUD, GnRH meds, diagnostic laparoscopy  with fulguration, and hysterectomy. After discussion we agreed a TLH, BS, Uterosacral ligament suspension, and cystoscopy were her best option. Discussed risk of bleeding, infection, damage to surrounding organs, or need for further surgery and she is accepting of these risks.      DO SUN[facility]

## 2025-03-18 NOTE — ANESTHESIA PROCEDURE NOTES
"TAP Procedure Note    Pre-Procedure   Staff -        Anesthesiologist:  Sam Amezquita MD       Performed By: anesthesiologist       Procedure Start/Stop Times: 3/18/2025 1:45 PM and 3/18/2025 1:50 PM       Pre-Anesthestic Checklist: patient identified, IV checked, site marked, risks and benefits discussed, informed consent, monitors and equipment checked, pre-op evaluation, at physician/surgeon's request and post-op pain management  Timeout:       Correct Patient: Yes        Correct Procedure: Yes        Correct Site: Yes        Correct Position: Yes        Correct Laterality: Yes        Site Marked: Yes  Procedure Documentation  Procedure: TAP         Laterality: bilateral       Patient Position: supine       Skin prep: Chloraprep       Needle Gauge: 20.        Needle Length (Inches): 6        Ultrasound guided       1. Ultrasound was used to identify targeted nerve, plexus, vascular marker, or fascial plane and place a needle adjacent to it in real-time.       2. Ultrasound was used to visualize the spread of anesthetic in close proximity to the above referenced structure.    Assessment/Narrative         The placement was negative for: blood aspirated, painful injection and site bleeding       Paresthesias: No.       Bolus given via needle. no blood aspirated via catheter.        Secured via.        Insertion/Infusion Method: Single Shot       Complications: none    Medication(s) Administered   Bupivacaine 0.25% PF (Infiltration) - Infiltration   40 mL - 3/18/2025 1:45:00 PM  Medication Administration Time: 3/18/2025 1:45 PM      FOR [facility] ([facility]) ONLY:   Pain Team Contact information: please page the Pain Team Via [employer]. Search \"Pain\". During daytime hours, please page the attending first. At night please page the resident first.      "

## 2025-03-18 NOTE — ANESTHESIA PROCEDURE NOTES
Airway       Patient location during procedure: OR       Procedure Start/Stop Times: 3/18/2025 1:34 PM  Staff -        CRNA: Arlyn Diallo APRN CRNA       Performed By: CRNA  Consent for Airway        Urgency: elective  Indications and Patient Condition       Indications for airway management: sejal-procedural       Induction type:intravenous       Mask difficulty assessment: 2 - vent by mask + OA or adjuvant +/- NMBA    Final Airway Details       Final airway type: endotracheal airway       Successful airway: ETT - single and Oral  Endotracheal Airway Details        ETT size (mm): 7.0       Cuffed: yes       Cuff volume (mL): 5       Successful intubation technique: direct laryngoscopy       DL Blade Type: Carpenter 2       Grade View of Cords: 1       Adjucts: stylet       Position: Right       Measured from: gums/teeth       Secured at (cm): 20       Bite block used: None    Post intubation assessment        Placement verified by: capnometry, equal breath sounds and chest rise        Number of attempts at approach: 1       Number of other approaches attempted: 0       Secured with: tape       Ease of procedure: easy       Dentition: Intact and Unchanged       Dental guard used and removed. Dental Guard Type: Standard White.    Medication(s) Administered   Medication Administration Time: 3/18/2025 1:34 PM

## 2025-03-18 NOTE — BRIEF OP NOTE
[facility]    Brief Operative Note    Pre-operative diagnosis: Abnormal uterine bleeding [N93.9]  Pelvic pain [R10.2]  Post-operative diagnosis Same as pre-operative diagnosis    Procedure: TOTAL LAPAROSCOPIC HYSTERECTOMY,, N/A - Abdomen  UTEROSACRAL LIGAMENT SUSPENSION, BILATERAL SALPINGECTOMY, CYSTOSCOPY, N/A - Abdomen    Surgeon: Surgeons and Role:     * Shelbie Adkins MD - Primary     * Naima Cavazos DO - Assisting  Anesthesia: General with Block   Estimated Blood Loss: Minimal    Drains: None  Specimens:   ID Type Source Tests Collected by Time Destination   1 : RIGHT FALLOPIAN TUBE Tissue Fallopian Tube, Right SURGICAL PATHOLOGY EXAM Shelbie Adkins MD 3/18/2025  2:55 PM    2 : LEFT FALLOPIAN TUBE Tissue Fallopian Tube, Left SURGICAL PATHOLOGY EXAM Shelbie Adkins MD 3/18/2025  2:57 PM    3 : UTERUS AND CEVIX Tissue Uterus, Cervix SURGICAL PATHOLOGY EXAM Shelbie Adkins MD 3/18/2025  2:58 PM      Findings:   None.  Complications: None.  Implants: * No implants in log *        I was present to assist Dr. Adkins for this laparoscopic total hysterectomy with bilateral salpingectomy. I assisted from incision to closure with visualization and my half of the procedure.     Naima Cavazos DO, [facility]  [phone]

## 2025-03-21 LAB
PATH REPORT.COMMENTS IMP SPEC: NORMAL
PATH REPORT.COMMENTS IMP SPEC: NORMAL
PATH REPORT.FINAL DX SPEC: NORMAL
PATH REPORT.GROSS SPEC: NORMAL
PATH REPORT.MICROSCOPIC SPEC OTHER STN: NORMAL
PATH REPORT.RELEVANT HX SPEC: NORMAL
PHOTO IMAGE: NORMAL

## 2025-03-21 PROCEDURE — 88307 TISSUE EXAM BY PATHOLOGIST: CPT | Mod: 26 | Performed by: PATHOLOGY

## 2025-05-24 ENCOUNTER — MYC REFILL (OUTPATIENT)
Dept: NEUROLOGY | Facility: CLINIC | Age: 40
End: 2025-05-24
Payer: COMMERCIAL

## 2025-05-24 DIAGNOSIS — G43.109 MIGRAINE WITH AURA AND WITHOUT STATUS MIGRAINOSUS, NOT INTRACTABLE: ICD-10-CM

## 2025-05-24 RX ORDER — SUMATRIPTAN SUCCINATE 100 MG/1
100 TABLET ORAL
Qty: 10 TABLET | Refills: 11 | Status: CANCELLED | OUTPATIENT
Start: 2025-05-24

## 2025-05-25 ENCOUNTER — MYC MEDICAL ADVICE (OUTPATIENT)
Dept: NEUROLOGY | Facility: CLINIC | Age: 40
End: 2025-05-25
Payer: COMMERCIAL

## 2025-05-25 DIAGNOSIS — G43.109 MIGRAINE WITH AURA AND WITHOUT STATUS MIGRAINOSUS, NOT INTRACTABLE: ICD-10-CM

## 2025-05-27 RX ORDER — SUMATRIPTAN SUCCINATE 100 MG/1
100 TABLET ORAL
Qty: 10 TABLET | Refills: 11 | Status: SHIPPED | OUTPATIENT
Start: 2025-05-27

## 2025-05-27 NOTE — TELEPHONE ENCOUNTER
Prescription approved per [facility] Refill Protocol.    Amina RN, BSN  [facility]

## 2025-08-13 ENCOUNTER — OFFICE VISIT (OUTPATIENT)
Dept: INTERNAL MEDICINE | Facility: CLINIC | Age: 40
End: 2025-08-13
Payer: COMMERCIAL

## 2025-08-13 VITALS
SYSTOLIC BLOOD PRESSURE: 89 MMHG | OXYGEN SATURATION: 100 % | HEART RATE: 93 BPM | RESPIRATION RATE: 16 BRPM | TEMPERATURE: 97.5 F | DIASTOLIC BLOOD PRESSURE: 61 MMHG | WEIGHT: 168 LBS | HEIGHT: 62 IN | BODY MASS INDEX: 30.91 KG/M2

## 2025-08-13 DIAGNOSIS — R05.1 ACUTE COUGH: ICD-10-CM

## 2025-08-13 DIAGNOSIS — J20.9 ACUTE BRONCHITIS WITH SYMPTOMS > 10 DAYS: ICD-10-CM

## 2025-08-13 DIAGNOSIS — H65.01 NON-RECURRENT ACUTE SEROUS OTITIS MEDIA OF RIGHT EAR: Primary | ICD-10-CM

## 2025-08-13 DIAGNOSIS — J01.90 ACUTE SINUSITIS WITH SYMPTOMS > 10 DAYS: ICD-10-CM

## 2025-08-13 PROCEDURE — 99214 OFFICE O/P EST MOD 30 MIN: CPT | Performed by: NURSE PRACTITIONER

## 2025-08-13 PROCEDURE — G2211 COMPLEX E/M VISIT ADD ON: HCPCS | Performed by: NURSE PRACTITIONER

## 2025-08-13 PROCEDURE — 3078F DIAST BP <80 MM HG: CPT | Performed by: NURSE PRACTITIONER

## 2025-08-13 PROCEDURE — 3074F SYST BP LT 130 MM HG: CPT | Performed by: NURSE PRACTITIONER

## 2025-08-13 RX ORDER — PREDNISONE 20 MG/1
20 TABLET ORAL DAILY
Qty: 3 TABLET | Refills: 0 | Status: SHIPPED | OUTPATIENT
Start: 2025-08-13 | End: 2025-08-16

## 2025-08-13 ASSESSMENT — ENCOUNTER SYMPTOMS: COUGH: 1

## 2025-08-25 ENCOUNTER — OFFICE VISIT (OUTPATIENT)
Dept: FAMILY MEDICINE | Facility: CLINIC | Age: 40
End: 2025-08-25
Payer: COMMERCIAL

## 2025-08-25 VITALS
DIASTOLIC BLOOD PRESSURE: 70 MMHG | OXYGEN SATURATION: 99 % | SYSTOLIC BLOOD PRESSURE: 100 MMHG | BODY MASS INDEX: 31.04 KG/M2 | WEIGHT: 167 LBS | TEMPERATURE: 98.5 F | RESPIRATION RATE: 18 BRPM | HEART RATE: 102 BPM

## 2025-08-25 DIAGNOSIS — J01.10 ACUTE NON-RECURRENT FRONTAL SINUSITIS: Primary | ICD-10-CM

## 2025-08-25 PROCEDURE — 99213 OFFICE O/P EST LOW 20 MIN: CPT | Performed by: NURSE PRACTITIONER

## 2025-08-25 PROCEDURE — 3078F DIAST BP <80 MM HG: CPT | Performed by: NURSE PRACTITIONER

## 2025-08-25 PROCEDURE — 3074F SYST BP LT 130 MM HG: CPT | Performed by: NURSE PRACTITIONER

## 2025-08-25 RX ORDER — PREDNISONE 20 MG/1
40 TABLET ORAL DAILY
Qty: 10 TABLET | Refills: 0 | Status: SHIPPED | OUTPATIENT
Start: 2025-08-25 | End: 2025-08-30

## 2025-08-25 RX ORDER — AZITHROMYCIN 250 MG/1
TABLET, FILM COATED ORAL
Qty: 6 TABLET | Refills: 0 | Status: SHIPPED | OUTPATIENT
Start: 2025-08-25

## (undated) DEVICE — DRAPE POUCH INSTRUMENT 3 POCKET 1018L

## (undated) DEVICE — ENDO TROCAR FIRST ENTRY KII FIOS Z-THRD 05X100MM CTF03

## (undated) DEVICE — ANTIFOG SOLUTION SEE SHARP 150M TROCAR SWABS 30978 (COI)

## (undated) DEVICE — SUTURE PASSOR W/GUIDE RSG-14F-4-WG

## (undated) DEVICE — TUBING SMOKE EVAC PNEUMOCLEAR HIGH FLOW 0620050250

## (undated) DEVICE — SOL NACL 0.9% IRRIG 1000ML BOTTLE 2F7124

## (undated) DEVICE — SU VICRYL+ 0 27 UR6 VLT VCP603H

## (undated) DEVICE — BLADE KNIFE SURG 10 371110

## (undated) DEVICE — ENDO TROCAR FIRST ENTRY KII FIOS Z-THRD 11X100MM CTF33

## (undated) DEVICE — SU VICRYL+ 0 27IN CT-2 UND VCP270H

## (undated) DEVICE — ENDO SHEARS RENEW LAP ENDOCUT SCISSOR TIP 16.5MM 3142

## (undated) DEVICE — ESU GROUND PAD ADULT REM W/15' CORD E7507DB

## (undated) DEVICE — DEVICE ENDO STITCH APPLIER 10MM 173016

## (undated) DEVICE — POSITIONING KIT THE PINK PAD XL 40X20X1IN 40583 (COI)

## (undated) DEVICE — SYR 50ML LL W/O NDL 309653

## (undated) DEVICE — SU MONOCRYL+ 4-0 18IN PS2 UND MCP496G

## (undated) DEVICE — SUTURE VICRYL+ 0 27IN CT-1 UND VCP260H

## (undated) DEVICE — MAT FLOOR WATERPROOF BACKSHEET FMBP30

## (undated) DEVICE — PREP CHLORAPREP 26ML TINTED HI-LITE ORANGE 930815

## (undated) DEVICE — SOL NACL 0.9% INJ 1000ML BAG 2B1324X

## (undated) DEVICE — SOL WATER IRRIG 1000ML BOTTLE 2F7114

## (undated) DEVICE — GLOVE UNDER INDICATOR PI SZ 6 LF 41660

## (undated) DEVICE — SURGICEL ENDOSCOPIC APPLICATOR FOR ORC POWDER 3123SPEA

## (undated) DEVICE — Device

## (undated) DEVICE — TUBING IRR LG BORE TUBE DRIP CHMBR 2 BG 94IN 313003

## (undated) DEVICE — PROTECTOR ARM STANDARD ONE STEP 40433 (COI)

## (undated) DEVICE — SUTURE MONOCRYL+ 4-0 PS-2 27IN MCP426H

## (undated) DEVICE — SUCTION STRYKERFLOW II 250-070-500

## (undated) DEVICE — ESU LIGASURE LAPAROSCOPIC BLUNT TIP SEALER 5MMX37CM LF1837

## (undated) DEVICE — NDL INSUFFLATION 13GA 120MM C2201

## (undated) DEVICE — VIAL DECANTER STERILE WHITE DYNJDEC06

## (undated) DEVICE — SU WND CLOSURE VLOC 180 ABS 0 12" GS-21 VLOCL0316

## (undated) DEVICE — ENDO TROCAR SLEEVE KII Z-THREADED 05X100MM CTS02

## (undated) DEVICE — RETR ELEV / UTERINE MANIPULATOR V-CARE LG CUP 60-6085-202A

## (undated) DEVICE — KOH COLPOTOMIZER OCCLUDER  CPO-6

## (undated) DEVICE — PREP POVIDONE-IODINE 7.5% SCRUB 4OZ BOTTLE MDS093945

## (undated) DEVICE — SYRINGE 10ML FILL SALINE FLUSH STERILE 306553

## (undated) DEVICE — SURGICEL POWDER ABSORBABLE HEMOSTAT 3GM 3013SP

## (undated) DEVICE — CUSTOM PACK PELVISCOPY SMA5BPVHEA

## (undated) DEVICE — SU WND CLOSURE RELOAD VLOC 180 ABS 0 GREEN 8" VLOCA008L

## (undated) DEVICE — GLOVE PI ULTRATCH M LF SZ 6.5 PF CUFF TEXT STRL LF 42665

## (undated) DEVICE — SOLUTION IV 2B0304X STRL WATER 1000ML

## (undated) DEVICE — ESU CORD MONOPOLAR 10'  E0510

## (undated) DEVICE — PREP POVIDONE-IODINE 10% SOLUTION 4OZ BOTTLE MDS093944

## (undated) RX ORDER — FENTANYL CITRATE 50 UG/ML
INJECTION, SOLUTION INTRAMUSCULAR; INTRAVENOUS
Status: DISPENSED
Start: 2025-03-18

## (undated) RX ORDER — PROPOFOL 10 MG/ML
INJECTION, EMULSION INTRAVENOUS
Status: DISPENSED
Start: 2025-03-18

## (undated) RX ORDER — BUPIVACAINE HYDROCHLORIDE 2.5 MG/ML
INJECTION, SOLUTION INFILTRATION; PERINEURAL
Status: DISPENSED
Start: 2025-03-18